# Patient Record
Sex: MALE | Race: ASIAN | NOT HISPANIC OR LATINO | Employment: UNEMPLOYED | URBAN - METROPOLITAN AREA
[De-identification: names, ages, dates, MRNs, and addresses within clinical notes are randomized per-mention and may not be internally consistent; named-entity substitution may affect disease eponyms.]

---
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
## 2021-01-05 ENCOUNTER — DOCUMENTATION ONLY (OUTPATIENT)
Dept: PHARMACY | Facility: CLINIC | Age: 21
End: 2021-01-05

## 2021-01-05 ENCOUNTER — HOME INFUSION (PRE-WILLOW HOME INFUSION) (OUTPATIENT)
Dept: PHARMACY | Facility: CLINIC | Age: 21
End: 2021-01-05

## 2021-01-05 NOTE — PROGRESS NOTES
Skilled Nurse visit in the  patient home to administer Fabrazyme 110mg/500ml ns. No recent elevated temperature, fever, chills, productive cough, coughing for 3 weeks or longer or hemoptysis, abnormal vital signs, night sweats, chest pain. No  decrease in your appetite, unexplained weight loss or fatigue.  No other new onset medical symptoms.  Current weight 236.  PIV placed left hand, 1 attempt.  Pre medicated with [id number] po, solumedrol 80mg iv. Infusion completed without complication or reaction. Pt reports therapy is effective in managing symptoms related to therapy.    Rudolph Lawrence RN   [facility]  [email or web address]  [phone]

## 2021-01-06 ENCOUNTER — HOME INFUSION (PRE-WILLOW HOME INFUSION) (OUTPATIENT)
Dept: PHARMACY | Facility: CLINIC | Age: 21
End: 2021-01-06

## 2021-01-06 NOTE — PROGRESS NOTES
This is a recent snapshot of the patient's [facility] medical record.  For current drug dose and complete information and questions, call [phone] or In Basket pool, fv home infusion ([id number])  CSN Number:  [id number]

## 2021-01-07 NOTE — PROGRESS NOTES
This is a recent snapshot of the patient's [facility] medical record.  For current drug dose and complete information and questions, call [phone] or In Basket pool, fv home infusion ([id number])  CSN Number:  [id number]

## 2021-01-08 NOTE — PROGRESS NOTES
This is a recent snapshot of the patient's [facility] medical record.  For current drug dose and complete information and questions, call [phone] or In Basket pool, fv home infusion ([id number])  CSN Number:  [id number]

## 2021-01-13 ENCOUNTER — HOME INFUSION (PRE-WILLOW HOME INFUSION) (OUTPATIENT)
Dept: PHARMACY | Facility: CLINIC | Age: 21
End: 2021-01-13

## 2021-01-14 ENCOUNTER — HOME INFUSION (PRE-WILLOW HOME INFUSION) (OUTPATIENT)
Dept: PHARMACY | Facility: CLINIC | Age: 21
End: 2021-01-14

## 2021-01-15 ENCOUNTER — HOME INFUSION (PRE-WILLOW HOME INFUSION) (OUTPATIENT)
Dept: PHARMACY | Facility: CLINIC | Age: 21
End: 2021-01-15

## 2021-01-15 NOTE — PROGRESS NOTES
This is a recent snapshot of the patient's [facility] medical record.  For current drug dose and complete information and questions, call [phone] or In Basket pool, fv home infusion ([id number])  CSN Number:  [id number]

## 2021-01-18 NOTE — PROGRESS NOTES
This is a recent snapshot of the patient's [facility] medical record.  For current drug dose and complete information and questions, call [phone] or In Basket pool, fv home infusion ([id number])  CSN Number:  [id number]

## 2021-01-19 ENCOUNTER — DOCUMENTATION ONLY (OUTPATIENT)
Dept: PHARMACY | Facility: CLINIC | Age: 21
End: 2021-01-19

## 2021-01-19 ENCOUNTER — HOME INFUSION (PRE-WILLOW HOME INFUSION) (OUTPATIENT)
Dept: PHARMACY | Facility: CLINIC | Age: 21
End: 2021-01-19

## 2021-01-19 ENCOUNTER — MEDICAL CORRESPONDENCE (OUTPATIENT)
Dept: HEALTH INFORMATION MANAGEMENT | Facility: CLINIC | Age: 21
End: 2021-01-19

## 2021-01-19 ENCOUNTER — TELEPHONE (OUTPATIENT)
Dept: PHARMACY | Facility: CLINIC | Age: 21
End: 2021-01-19

## 2021-01-19 LAB
ALBUMIN SERPL-MCNC: 3.8 G/DL (ref 3.4–5)
ALP SERPL-CCNC: 65 U/L (ref 65–260)
ALT SERPL W P-5'-P-CCNC: 12 U/L (ref 0–50)
ANION GAP SERPL CALCULATED.3IONS-SCNC: 6 MMOL/L (ref 3–14)
AST SERPL W P-5'-P-CCNC: 15 U/L (ref 0–35)
BASOPHILS # BLD AUTO: 0 10E9/L (ref 0–0.2)
BASOPHILS NFR BLD AUTO: 0.5 %
BILIRUB SERPL-MCNC: 0.7 MG/DL (ref 0.2–1.3)
BUN SERPL-MCNC: 9 MG/DL (ref 7–30)
CALCIUM SERPL-MCNC: 8.4 MG/DL (ref 8.5–10.1)
CHLORIDE SERPL-SCNC: 109 MMOL/L (ref 98–110)
CO2 SERPL-SCNC: 26 MMOL/L (ref 20–32)
CREAT SERPL-MCNC: 0.62 MG/DL (ref 0.5–1)
CREAT UR-MCNC: 128 MG/DL
DIFFERENTIAL METHOD BLD: NORMAL
EOSINOPHIL # BLD AUTO: 0.2 10E9/L (ref 0–0.7)
EOSINOPHIL NFR BLD AUTO: 3.6 %
ERYTHROCYTE [DISTWIDTH] IN BLOOD BY AUTOMATED COUNT: 13.5 % (ref 10–15)
GFR SERPL CREATININE-BSD FRML MDRD: >90 ML/MIN/{1.73_M2}
GLUCOSE SERPL-MCNC: 82 MG/DL (ref 70–99)
HCT VFR BLD AUTO: 45.7 % (ref 40–53)
HGB BLD-MCNC: 15 G/DL (ref 13.3–17.7)
IMM GRANULOCYTES # BLD: 0 10E9/L (ref 0–0.4)
IMM GRANULOCYTES NFR BLD: 0.4 %
LYMPHOCYTES # BLD AUTO: 2.3 10E9/L (ref 0.8–5.3)
LYMPHOCYTES NFR BLD AUTO: 40.3 %
MCH RBC QN AUTO: 30.1 PG (ref 26.5–33)
MCHC RBC AUTO-ENTMCNC: 32.8 G/DL (ref 31.5–36.5)
MCV RBC AUTO: 92 FL (ref 78–100)
MICROALBUMIN UR-MCNC: 22 MG/L
MICROALBUMIN/CREAT UR: 17.11 MG/G CR (ref 0–17)
MONOCYTES # BLD AUTO: 0.5 10E9/L (ref 0–1.3)
MONOCYTES NFR BLD AUTO: 9.4 %
NEUTROPHILS # BLD AUTO: 2.6 10E9/L (ref 1.6–8.3)
NEUTROPHILS NFR BLD AUTO: 45.8 %
NRBC # BLD AUTO: 0 10*3/UL
NRBC BLD AUTO-RTO: 0 /100
PLATELET # BLD AUTO: 312 10E9/L (ref 150–450)
POTASSIUM SERPL-SCNC: 3.8 MMOL/L (ref 3.4–5.3)
PROT SERPL-MCNC: 6.6 G/DL (ref 6.8–8.8)
PROT UR-MCNC: 0.21 G/L
PROT/CREAT 24H UR: 0.16 G/G CR (ref 0–0.2)
RBC # BLD AUTO: 4.99 10E12/L (ref 4.4–5.9)
SODIUM SERPL-SCNC: 141 MMOL/L (ref 133–144)
WBC # BLD AUTO: 5.6 10E9/L (ref 4–11)

## 2021-01-19 PROCEDURE — 85025 COMPLETE CBC W/AUTO DIFF WBC: CPT | Performed by: PEDIATRICS

## 2021-01-19 PROCEDURE — 80053 COMPREHEN METABOLIC PANEL: CPT | Performed by: PEDIATRICS

## 2021-01-19 PROCEDURE — 84156 ASSAY OF PROTEIN URINE: CPT | Performed by: PEDIATRICS

## 2021-01-19 PROCEDURE — 82043 UR ALBUMIN QUANTITATIVE: CPT | Performed by: PEDIATRICS

## 2021-01-19 NOTE — TELEPHONE ENCOUNTER
[facility] PRIOR AUTHORIZATION REQUEST     Drug including DOSE: FABRAZYME 110 mg IV Every 2 Weeks  J Code: , , 92638 and 00266  NDC:[address]-17  ICD 10 code: E75.21    Date(s) of Service: 1/19/2021  Insurance Name: [employer]  Insurance ID: [id number]    Provider: FRANK SANTO MD  Provider NPI:[id number]             [facility]  NPI: [id number]

## 2021-01-19 NOTE — TELEPHONE ENCOUNTER
PA Initiation    Medication: FABRAZYME 110 mg IV Every 2 Weeks  Insurance Company: [employer] - Phone [phone] Fax [phone]  Pharmacy Filling the Rx: [facility]  Filling Pharmacy Phone:    Filling Pharmacy Fax:    Start Date: 1/19/2021    Central Prior Authorization Team   Phone: [phone]    Filled out form and faxed it along with chart notes to [facility] fax# [phone]

## 2021-01-19 NOTE — PROGRESS NOTES
Skilled Nurse visit in the  patient home to administer Fabrazyme 110mg/500ml ns.  No recent elevated temperature, fever, chills, productive cough, coughing for 3 weeks or longer or hemoptysis, abnormal vital signs, night sweats, chest pain. No  decrease in your appetite, unexplained weight loss or fatigue.  No other new onset medical symptoms.  Current weight 235.  PIV placed right hand, 1 attempt.  Pre medicated with acetaminophen 975mg po amd solumedrol 80mg iv. Labs drawn CBC d/p, CMP, random urine prootein and albumin quantitative with creat ratio. Infusion completed without complication or reaction. Pt reports therapy is necessary in preventing symptoms related to therapy.    Rudolph Lawrence RN   [facility]  [email or web address]  [phone]

## 2021-01-20 NOTE — PROGRESS NOTES
This is a recent snapshot of the patient's [facility] medical record.  For current drug dose and complete information and questions, call [phone] or In Basket pool, fv home infusion ([id number])  CSN Number:  [id number]

## 2021-01-22 NOTE — PROGRESS NOTES
This is a recent snapshot of the patient's [facility] medical record.  For current drug dose and complete information and questions, call [phone] or In Basket pool, fv home infusion ([id number])  CSN Number:  [id number]

## 2021-01-22 NOTE — TELEPHONE ENCOUNTER
Prior Authorization Approval    Authorization Effective Date: 12/21/2020  Authorization Expiration Date: 1/20/2023  Medication: FABRAZYME 110 mg IV Every 2 Weeks  Approved Dose/Quantity:   Reference #: n/a   Insurance Company: [employer] - Phone [phone] Fax [phone]    Which Pharmacy is filling the prescription (Not needed for infusion/clinic administered): [facility]  Pharmacy Notified: Yes

## 2021-01-29 ENCOUNTER — HOME INFUSION (PRE-WILLOW HOME INFUSION) (OUTPATIENT)
Dept: PHARMACY | Facility: CLINIC | Age: 21
End: 2021-01-29

## 2021-02-01 ENCOUNTER — APPOINTMENT (OUTPATIENT)
Dept: LAB | Facility: CLINIC | Age: 21
End: 2021-02-01
Attending: PEDIATRICS
Payer: COMMERCIAL

## 2021-02-01 NOTE — PROGRESS NOTES
This is a recent snapshot of the patient's [facility] medical record.  For current drug dose and complete information and questions, call [phone] or In Basket pool, fv home infusion ([id number])  CSN Number:  [id number]

## 2021-02-02 ENCOUNTER — DOCUMENTATION ONLY (OUTPATIENT)
Dept: PHARMACY | Facility: CLINIC | Age: 21
End: 2021-02-02

## 2021-02-02 ENCOUNTER — HOME INFUSION (PRE-WILLOW HOME INFUSION) (OUTPATIENT)
Dept: PHARMACY | Facility: CLINIC | Age: 21
End: 2021-02-02

## 2021-02-02 NOTE — PROGRESS NOTES
Skilled Nurse visit in the  patient home to administer Fabrazyme.  No recent elevated temperature, fever, chills, productive cough, coughing for 3 weeks or longer or hemoptysis, abnormal vital signs, night sweats, chest pain. No  decrease in your appetite, unexplained weight loss or fatigue.  No other new onset medical symptoms.  Current weight 235 lb.  PIV placed L hand1, 1 attempt/s.  Pre medicated with Acetaminophen 975 mg by mouth at 0905  Methylprednisolone 80 mg IV push over 15 minutes, given at 0912. Infusion completed without complication or reaction. Pt reports therapy is effective in managing symptoms related to therapy.    Patti Diop RN  [facility]  [email or web address]  [phone]

## 2021-02-03 NOTE — PROGRESS NOTES
This is a recent snapshot of the patient's [facility] medical record.  For current drug dose and complete information and questions, call [phone] or In Basket pool, fv home infusion ([id number])  CSN Number:  [id number]

## 2021-02-12 ENCOUNTER — HOME INFUSION (PRE-WILLOW HOME INFUSION) (OUTPATIENT)
Dept: PHARMACY | Facility: CLINIC | Age: 21
End: 2021-02-12

## 2021-02-15 NOTE — PROGRESS NOTES
This is a recent snapshot of the patient's [facility] medical record.  For current drug dose and complete information and questions, call [phone] or In Basket pool, fv home infusion ([id number])  CSN Number:  [id number]

## 2021-02-16 ENCOUNTER — HOME INFUSION (PRE-WILLOW HOME INFUSION) (OUTPATIENT)
Dept: PHARMACY | Facility: CLINIC | Age: 21
End: 2021-02-16

## 2021-02-16 ENCOUNTER — DOCUMENTATION ONLY (OUTPATIENT)
Dept: PHARMACY | Facility: CLINIC | Age: 21
End: 2021-02-16

## 2021-02-16 NOTE — PROGRESS NOTES
Skilled Nurse visit in the  patient home to administer Fabrazyme 110mg/500ml ns.  No recent elevated temperature, fever, chills, productive cough, coughing for 3 weeks or longer or hemoptysis, abnormal vital signs, night sweats, chest pain. No  decrease in your appetite, unexplained weight loss or fatigue.  No other new onset medical symptoms.  Current weight 225.  PIV placed left hand, 1 attempt.  Pre medicated with acetaminophen 975mg po, solumedrol 80mg iv. Infusion completed without complication or reaction. Pt reports therapy is effective in managing symptoms related to therapy.    Rudolph Lawrence RN   [facility]  [email or web address]  [phone]

## 2021-02-17 ENCOUNTER — HOME INFUSION (PRE-WILLOW HOME INFUSION) (OUTPATIENT)
Dept: PHARMACY | Facility: CLINIC | Age: 21
End: 2021-02-17

## 2021-02-17 NOTE — PROGRESS NOTES
This is a recent snapshot of the patient's [facility] medical record.  For current drug dose and complete information and questions, call [phone] or In Basket pool, fv home infusion ([id number])  CSN Number:  [id number]

## 2021-02-18 NOTE — PROGRESS NOTES
This is a recent snapshot of the patient's [facility] medical record.  For current drug dose and complete information and questions, call [phone] or In Basket pool, fv home infusion ([id number])  CSN Number:  [id number]

## 2021-02-26 ENCOUNTER — HOME INFUSION (PRE-WILLOW HOME INFUSION) (OUTPATIENT)
Dept: PHARMACY | Facility: CLINIC | Age: 21
End: 2021-02-26

## 2021-03-01 ENCOUNTER — APPOINTMENT (OUTPATIENT)
Dept: LAB | Facility: CLINIC | Age: 21
End: 2021-03-01
Attending: PEDIATRICS
Payer: COMMERCIAL

## 2021-03-01 NOTE — PROGRESS NOTES
This is a recent snapshot of the patient's [facility] medical record.  For current drug dose and complete information and questions, call [phone] or In Basket pool, fv home infusion ([id number])  CSN Number:  [id number]

## 2021-03-02 ENCOUNTER — DOCUMENTATION ONLY (OUTPATIENT)
Dept: PHARMACY | Facility: CLINIC | Age: 21
End: 2021-03-02

## 2021-03-02 ENCOUNTER — HOME INFUSION (PRE-WILLOW HOME INFUSION) (OUTPATIENT)
Dept: PHARMACY | Facility: CLINIC | Age: 21
End: 2021-03-02

## 2021-03-02 NOTE — PROGRESS NOTES
Skilled Nurse visit in the  patient home to administer Fabrazyme 110mg/500ml ns.  No recent elevated temperature, fever, chills, productive cough, coughing for 3 weeks or longer or hemoptysis, abnormal vital signs, night sweats, chest pain. No  decrease in your appetite, unexplained weight loss or fatigue.  No other new onset medical symptoms.  Current weight 220.  PIV placed rightAC,1 attempt.  Pre medicated with acetaminophen 975mg po, solumedrol 80mg iv. Infusion completed without complication or reaction. Pt reports therapy is effective in managing symptoms related to therapy.    Rudolph Lawrence RN   [facility]  [email or web address]  [phone]

## 2021-03-03 NOTE — PROGRESS NOTES
This is a recent snapshot of the patient's [facility] medical record.  For current drug dose and complete information and questions, call [phone] or In Basket pool, fv home infusion ([id number])  CSN Number:  [id number]

## 2021-03-12 ENCOUNTER — HOME INFUSION (PRE-WILLOW HOME INFUSION) (OUTPATIENT)
Dept: PHARMACY | Facility: CLINIC | Age: 21
End: 2021-03-12

## 2021-03-15 NOTE — PROGRESS NOTES
This is a recent snapshot of the patient's [facility] medical record.  For current drug dose and complete information and questions, call [phone] or In Basket pool, fv home infusion ([id number])  CSN Number:  [id number]

## 2021-03-16 ENCOUNTER — HOME INFUSION (PRE-WILLOW HOME INFUSION) (OUTPATIENT)
Dept: PHARMACY | Facility: CLINIC | Age: 21
End: 2021-03-16

## 2021-03-16 ENCOUNTER — DOCUMENTATION ONLY (OUTPATIENT)
Dept: PHARMACY | Facility: CLINIC | Age: 21
End: 2021-03-16

## 2021-03-16 NOTE — PROGRESS NOTES
Skilled Nurse visit in the  patient home to administer fabrazyme 110mg/500ml ns  No recent elevated temperature, fever, chills, productive cough, coughing for 3 weeks or longer or hemoptysis, abnormal vital signs, night sweats, chest pain. No  decrease in your appetite, unexplained weight loss or fatigue.  No other new onset medical symptoms.  Current weight 220.  PIV placed right hand, 1 attempt.  Pre medicated with acetaminophen 975mg po, solumedrol 80mg iv. Infusion completed without complication or reaction. Pt reports therapy is effective in managing symptoms related to therapy.    Rudolph Lawrence RN   [facility]  [email or web address]  [phone]

## 2021-03-17 NOTE — PROGRESS NOTES
"Chief Complaint   Patient presents with     Wellness Visit       Initial /75 (BP Location: Right arm, Patient Position: Chair, Cuff Size: Adult Regular)  Pulse 89  Temp 97.5  F (36.4  C) (Tympanic)  Ht 5' 2\" (1.575 m)  Wt 140 lb (63.5 kg)  SpO2 99%  Breastfeeding? No  BMI 25.61 kg/m2 Estimated body mass index is 25.61 kg/(m^2) as calculated from the following:    Height as of this encounter: 5' 2\" (1.575 m).    Weight as of this encounter: 140 lb (63.5 kg).  Medication Reconciliation: complete   Maru Banks CMA       " This is a recent snapshot of the patient's [facility] medical record.  For current drug dose and complete information and questions, call [phone] or In Basket pool, fv home infusion ([id number])  CSN Number:  [id number]

## 2021-03-26 ENCOUNTER — HOME INFUSION (PRE-WILLOW HOME INFUSION) (OUTPATIENT)
Dept: PHARMACY | Facility: CLINIC | Age: 21
End: 2021-03-26

## 2021-03-29 ENCOUNTER — VIRTUAL VISIT (OUTPATIENT)
Dept: CONSULT | Facility: CLINIC | Age: 21
End: 2021-03-29
Attending: PEDIATRICS
Payer: COMMERCIAL

## 2021-03-29 ENCOUNTER — VIRTUAL VISIT (OUTPATIENT)
Dept: CONSULT | Facility: CLINIC | Age: 21
End: 2021-03-29
Attending: GENETIC COUNSELOR, MS
Payer: COMMERCIAL

## 2021-03-29 DIAGNOSIS — E75.21 FABRY DISEASE (H): Primary | ICD-10-CM

## 2021-03-29 PROCEDURE — 999N000069 HC STATISTIC GENETIC COUNSELING, < 16 MIN: Mod: GT | Performed by: GENETIC COUNSELOR, MS

## 2021-03-29 PROCEDURE — 99214 OFFICE O/P EST MOD 30 MIN: CPT | Mod: 95 | Performed by: PEDIATRICS

## 2021-03-29 NOTE — LETTER
3/29/2021      RE: Joel Lundberg  [address] [facility] [address]       Name:  Joel Aviles  :   2000  MRN:   [id number]  Date of service: Mar 29, 2021  Primary Provider: Yariel Carty  Referring Provider: No ref. provider found    PRESENTING INFORMATION   Reason for consultation:  A consultation in the [facility] was requested for Joel, a 20 year old male, for Fabry disease.    Joel was unaccompanied to this visit.    History is obtained from Patient and electronic health record. I met with the family at the request of Dr. Rod to offer genetic counseling regarding his diagnosis of Fabry.    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 The notation of this mutation refers to its location and its effect.  This mutation is reported to not be amenable to the medication Galafold.      Family history is significant for mother and siblings with Fabry disease.    Joel expressed excellent understanding of genetic counseling concepts today.      1. Genetic counseling remains available to Joel if/when he is considering children    2. Contact information was provided should any questions arise in the future.       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 The notation of this mutation refers to its location and its effect.  This mutation is reported to not be amenable to the medication Galafold.        Patient Active Problem List   Diagnosis     Fabry disease (H)     Vasovagal syncope       Past Medical History:  No past medical history on  file.    Past Surgical History:  No past surgical history on file.     FAMILY HISTORY  A three generation pedigree was obtained today and scanned into the EMR. The following information is significant:    Siblings    Full siblings: 2 brothers and 1 sister with Fabry disease.  3 brothers without Fabry disease.  1 brother with Fabry disease also has lymphedema.    Paternal half siblings: None    Maternal half siblings: None    Maternal Family    Mother, OUMAR FIELDS: Fabry disease.  Neuropathy, fatigue, SVT hypertension    Maternal grandfather: Passed from MI at age 50.  Lymphedema and cellulitis. No genetic testing    Maternal grandmother: Type 2 diabetes, neuropathy, glaucoma. CHD. Now 86. No genetic testing    Maternal aunts/uncles: 1 aunt with Fabry disease.  3 reportedly unaffected uncles who have not had genetic testing.    Maternal cousins: 1 female cousin with neuropathy.  Others are well    Maternal ancestry: Pal[place], English, Ivorian    Paternal Family    Father, Tee Lundberg: Well    Paternal grandfather: Passed at age 70 due to TB and heart failure    Paternal grandmother: Well    Paternal aunts/uncles: 1 and 2 passed from asthma attack at 40    Paternal cousins: Well    Paternal ancestry: Indian    The family history is otherwise negative for Fabry disease, neuropathy, kidney issues, fatigue, cardiac concerns, and known genetic disorders. Consanguinity is denied.      DISCUSSION  Genetics  Today we reviewed that our genetic material or DNA is responsible for how our bodies grow and develop. It can be thought of as an instruction manual. This instruction manual is made up of chapters called genes. Our genes are inherited on structures called chromosomes, of which we have 23 pairs for a total of 46. For each chromosome pair, one copy is inherited from the mother and one is inherited from the father. The chromosome pairs are numbered from 1 to 22, and the 23rd pair of chromsomes is called the sex  "chromsomes. These determine if we are a male or female.     Changes in the chromosomes or in the DNA sequence of a gene can cause the signs and symptoms of a genetic condition because the instructions it is providing to the body have been altered. This can be a small spelling error in the gene, a large duplicated piece of information, or a large missing piece of information. We reviewed that Fabry disease is a genetic condition caused by mutations in a gene called GLA which is found on the X-chromosome. This gene codes for a lysosomal protein called alpha-galactosidase A. This enzyme is normally responsible for breaking down a substance called globotriaosylceramide in the lysosomes of cells. Mutations in the GLA gene, therefore, disrupt this degradation process and lead to the accumulation of globotriaosylceramide. This damages cells and can lead to the signs and symptoms of Fabry disease.       X-linked inheritance was discussed. We reviewed how women have two copies of the X-chromosome while men have one copy of the X-chromosome and one copy of the Y-chromosome. The GLA gene is found on the X-chromosome and therefore women have two copies of the GLA gene while men have just one. Because women have this \"back-up\" copy of the GLA gene, a GLA mutation does not typically affect females to the same degree as it does males.  Whether or not a female experiences symptoms of Fabry disease and the severity of symptoms is at least partially determined by differences in X-inactivation.      Because Joel is now 20-years-old we did spend time reviewing the recurrence risk for Fabry disease.  As noted above, Joel has just one copy of the GLA gene on his single copy of the X-chromosome.  For each child he has, there is a 50% chance Joel would pass on his X-chromosome and a 50% chance he would pass on his Y-chromosome.  If he passed on his X-chromosome, that child would be a daughter who is a carrier for Fabry disease and " at risk to have symptoms.  If he passed on his Y-chromosome, that child would be a boy who does not have Fabry disease.  Joel expressed understanding of this.      Reproductive Options  Some individuals chose to have genetic testing performed to see if their child will have a the familial genetic condition. This can be done at different stages during the pregnancy.      The first option is called pre-implantation genetic testing (PGT), which was previously called PGD. Eggs and sperm are taken from parents and fertilized outside the body. This testing is done on the fertilized embryo. The embryos without the genetic change are implanted into the mother with in-vitro fertilization (IVF). Each individual case needs to be approved by the PGT lab. Centers that perform PGT include:     The second option is called prenatal genetic testing. This can be during pregnancy. This test is done on placental tissue through a procedure called chorionic villus sampling (CVS) or on amniotic fluid through a procedure called amniocentesis. These procedures are accompanied by a small risk of miscarriage, which varies by institution.    Post-sandra genetic testing can be done after a child is born. We discussed that I can coordinate this testing if she is interested in this option.    Rather than use genetic testing, some couples choose to have gametes (i.e. sperm, egg, or embryo) donated or choose to adopt a child.    Lastly, some couples choose to have unassisted pregnancies without the use of genetic testing.    There are benefits and limitations of each of these options including timing, cost, accuracy of the genetic testing, how the information would be used by the couple (e.g. for prevention, termination, or preparation), risk to the pregnancy, and personal beliefs. How the couple manages anxiety in the context of waiting for genetic test results should also be considered.       We also briefly reviewed that carrier screening is  available for other genetic conditions, which can be discussed with a prenatal genetic counselor      Yulisa Ashley [facility]  Genetic Counselor   [facility]   Phone: [phone]  Pager: [phone]  Email: [email or web address]          Approximate Time Spent in Consultation: 10 min     CC: patient    Joel Oliveira Tamika  [address] [facility] [address]      This note was written with the assistance of voice recognition software and may contain occasional typographic errors. Please contact our office if you identify errors requiring correction.    Joel is a 20 year old who is being evaluated via a billable video visit.      How would you like to obtain your AVS? Mail a copy  If the video visit is dropped, the invitation should be resent by: Send to e-mail at: [email or web address]  Will anyone else be joining your video visit? No      Yulisa Ashley GC

## 2021-03-29 NOTE — PROGRESS NOTES
Name:  Joel Aviles  :   2000  MRN:   [id number]  Date of service: Mar 29, 2021  Primary Provider: Yariel Carty  Referring Provider: No ref. provider found    PRESENTING INFORMATION   Reason for consultation:  A consultation in the [facility] was requested for Joel, a 20 year old male, for Fabry disease.    Joel was unaccompanied to this visit.    History is obtained from Patient and electronic health record. I met with the family at the request of Dr. Rod to offer genetic counseling regarding his diagnosis of Fabry.    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 Joel's diagnosis of Fabry disease was made in  when he was 10 years old at the [facility].  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 The notation of this mutation refers to its location and its effect.  This mutation is reported to not be amenable to the medication Galafold.      Family history is significant for mother and siblings with Fabry disease.    Joel expressed excellent understanding of genetic counseling concepts today.      1. Genetic counseling remains available to Joel if/when he is considering children    2. Contact information was provided should any questions arise in the future.       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 The notation of this mutation refers to its location and its effect.  This mutation is reported to not be amenable to the medication Galafold.        Patient Active Problem List   Diagnosis     Fabry disease (H)     Vasovagal syncope       Past Medical History:  No past medical history on file.    Past Surgical History:  No past surgical history on file.     FAMILY HISTORY  A three  generation pedigree was obtained today and scanned into the EMR. The following information is significant:    Siblings    Full siblings: 2 brothers and 1 sister with Fabry disease.  3 brothers without Fabry disease.  1 brother with Fabry disease also has lymphedema.    Paternal half siblings: None    Maternal half siblings: None    Maternal Family    Mother, OUMAR FIELDS: Fabry disease.  Neuropathy, fatigue, SVT hypertension    Maternal grandfather: Passed from MI at age 50.  Lymphedema and cellulitis. No genetic testing    Maternal grandmother: Type 2 diabetes, neuropathy, glaucoma. CHD. Now 86. No genetic testing    Maternal aunts/uncles: 1 aunt with Fabry disease.  3 reportedly unaffected uncles who have not had genetic testing.    Maternal cousins: 1 female cousin with neuropathy.  Others are well    Maternal ancestry: Palestinian, English, Japanese    Paternal Family    Father, Tee Lundberg: Well    Paternal grandfather: Passed at age 70 due to TB and heart failure    Paternal grandmother: Well    Paternal aunts/uncles: 1 and 2 passed from asthma attack at 40    Paternal cousins: Well    Paternal ancestry: Cymraes    The family history is otherwise negative for Fabry disease, neuropathy, kidney issues, fatigue, cardiac concerns, and known genetic disorders. Consanguinity is denied.      DISCUSSION  Genetics  Today we reviewed that our genetic material or DNA is responsible for how our bodies grow and develop. It can be thought of as an instruction manual. This instruction manual is made up of chapters called genes. Our genes are inherited on structures called chromosomes, of which we have 23 pairs for a total of 46. For each chromosome pair, one copy is inherited from the mother and one is inherited from the father. The chromosome pairs are numbered from 1 to 22, and the 23rd pair of chromsomes is called the sex chromsomes. These determine if we are a male or female.     Changes in the chromosomes or in the  "DNA sequence of a gene can cause the signs and symptoms of a genetic condition because the instructions it is providing to the body have been altered. This can be a small spelling error in the gene, a large duplicated piece of information, or a large missing piece of information. We reviewed that Fabry disease is a genetic condition caused by mutations in a gene called GLA which is found on the X-chromosome. This gene codes for a lysosomal protein called alpha-galactosidase A. This enzyme is normally responsible for breaking down a substance called globotriaosylceramide in the lysosomes of cells. Mutations in the GLA gene, therefore, disrupt this degradation process and lead to the accumulation of globotriaosylceramide. This damages cells and can lead to the signs and symptoms of Fabry disease.       X-linked inheritance was discussed. We reviewed how women have two copies of the X-chromosome while men have one copy of the X-chromosome and one copy of the Y-chromosome. The GLA gene is found on the X-chromosome and therefore women have two copies of the GLA gene while men have just one. Because women have this \"back-up\" copy of the GLA gene, a GLA mutation does not typically affect females to the same degree as it does males.  Whether or not a female experiences symptoms of Fabry disease and the severity of symptoms is at least partially determined by differences in X-inactivation.      Because Joel is now 20-years-old we did spend time reviewing the recurrence risk for Fabry disease.  As noted above, Joel has just one copy of the GLA gene on his single copy of the X-chromosome.  For each child he has, there is a 50% chance Joel would pass on his X-chromosome and a 50% chance he would pass on his Y-chromosome.  If he passed on his X-chromosome, that child would be a daughter who is a carrier for Fabry disease and at risk to have symptoms.  If he passed on his Y-chromosome, that child would be a boy who does " not have Fabry disease.  Joel expressed understanding of this.      Reproductive Options  Some individuals chose to have genetic testing performed to see if their child will have a the familial genetic condition. This can be done at different stages during the pregnancy.      The first option is called pre-implantation genetic testing (PGT), which was previously called PGD. Eggs and sperm are taken from parents and fertilized outside the body. This testing is done on the fertilized embryo. The embryos without the genetic change are implanted into the mother with in-vitro fertilization (IVF). Each individual case needs to be approved by the PGT lab. Centers that perform PGT include:     The second option is called prenatal genetic testing. This can be during pregnancy. This test is done on placental tissue through a procedure called chorionic villus sampling (CVS) or on amniotic fluid through a procedure called amniocentesis. These procedures are accompanied by a small risk of miscarriage, which varies by institution.    Post-sandra genetic testing can be done after a child is born. We discussed that I can coordinate this testing if she is interested in this option.    Rather than use genetic testing, some couples choose to have gametes (i.e. sperm, egg, or embryo) donated or choose to adopt a child.    Lastly, some couples choose to have unassisted pregnancies without the use of genetic testing.    There are benefits and limitations of each of these options including timing, cost, accuracy of the genetic testing, how the information would be used by the couple (e.g. for prevention, termination, or preparation), risk to the pregnancy, and personal beliefs. How the couple manages anxiety in the context of waiting for genetic test results should also be considered.       We also briefly reviewed that carrier screening is available for other genetic conditions, which can be discussed with a prenatal genetic  counselor      Yulisa Ashley [facility]  Genetic Counselor   [facility]   Phone: [phone]  Pager: [phone]  Email: [email or web address]          Approximate Time Spent in Consultation: 10 min     CC: patient      This note was written with the assistance of voice recognition software and may contain occasional typographic errors. Please contact our office if you identify errors requiring correction.

## 2021-03-29 NOTE — LETTER
"  3/29/2021      RE: Joel Patiñoih  [address] [facility] [address]       Joel is a 20 year old who is being evaluated via a billable video visit.      How would you like to obtain your AVS? Mail a copy  If the video visit is dropped, the invitation should be resent by: Send to e-mail at: [email or web address]  Will anyone else be joining your video visit? No                    Advanced [facility]ies  [facility] [address]  [address] [place] [address]   Phone: [phone]  Fax: [phone]  Date: 2021      Patient:  Joel Lundberg   :   2000   MRN:     [id number]      Joel Patiñoih  [address] [facility] [address]    Dear Dr. Yariel Carty and Joel Lundberg,    Thank you for sending Joel Lundberg to the [facility] Monday \"Advanced Therapies Clinic\" for consultation and treatment of:    No diagnosis found.    PAST MEDICAL HISTORY:    From the oral history, and medical records that are available, these items are noted:    Patient Active Problem List   Diagnosis     Fabry disease (H)     Vasovagal syncope       Medications:  Current Outpatient Medications   Medication Sig     agalsidase beta (FABRAZYME) 5 MG Inject 90 mg into the vein every 14 days Please see Epic Therapy Plan for infusion order details.     Cholecalciferol (VITAMIN D-3 PO) Take 25,000 Int'l Units by mouth once a week     Montelukast Sodium (SINGULAIR PO) Take 5 mg by mouth as needed      OMEPRAZOLE PO Take 40 mg by mouth as needed      Pseudoeph-Doxylamine-DM-APAP (NYQUIL PO) Take by mouth At Bedtime     No current facility-administered medications for this visit.        Allergies:  Allergies   Allergen Reactions     Sulfa Drugs        Physical Examination:  There were no vitals taken for this visit.  Weight %tile:No weight on file for this encounter.  Height %tile: No height on file for this encounter.  Head Circumference %tile: No head circumference on file " for this encounter.  BMI %tile: No height and weight on file for this encounter.    FAMILY HISTORY: A brief family medical history was reviewed.  REVIEW OF SYSTEMS: The review of systems negative for new eye, ear, heart, lung, liver, spleen, gastrointestinal, bone, muscle, integumentary, endocrinologic, brain or psychiatric issues except as noted above.  PHYSICAL EXAMINATION:   General: The patient is oriented to person, place and time at an age-appropriate manner.   HEENT: The facial features are normal and symmetric. The ears are of normal position and configuration and hearing is grossly normal.  The tongue protrudes normally without fasciculations.  Neck: The neck  appears to be supple with full range of motion  Chest: The chest is of normal configuration. There is no tachypnea or other visible abnormalies.  Heart: The patient appears to be well perfused, and in no apparent distress.  Abdomen: Not examined.  Extremities: The extremities are of normal configuration.  Back: Not examined,  Integument: The  visible portion of the integument is  of normal appearance without significant changes in pigmentation, birthmarks, or lesions.  Neuromuscular:  Mental Status Exam: Alert, awake. Fully oriented. No dysarthria; speech of normal fluency.  Cranial Nerves: EOMs intact, no nystagmus, facial movements symmetric.   Motor: There appears to be normal movement in all four extremities, no atrophy or fasciculations. No tremors.  Gait: By visual examination, there appears to be normal gait; normal arm swing and stance.    LABORATORY RESULTS: Laboratory studies from the past year were reviewed.    ASSESSMENT:  1. Fabry disease  2. Good response to enzyme replacement therapy    PLAN/RECOMMENDATIONS:  1. Continue Fabrazyme enzyme replacement therapy  2. Comprehensive metabolic panel  3. Recheck lyso-GL3 biomarker levels every 3 months  4. Recheck anti-Fabrazyme antibody levels every 3 months.  5. Complete blood  "count  6. Pharmacotherapy Consultation for Rare Metabolic Diseases, Dr. Ambrosio Almonte  7. Return to [facility] Clinic in 12 months.  There will be an informational scientific meeting with international experts who are knowledgeable about your condition --- the 17th annual WORLDSymposium (February 7-11, 2022).     FOLLOW-UP INSTRUCTIONS FOR THE PATIENT:  If you are returning to clinic to review specific laboratory tests, please call the Genetic Counselor (see phone numbers below)  to confirm that we have received all of the results from reference laboratories prior to your appointment. If we have not received all of the test results, please discuss re-scheduling your appointment.    With warmest regards,      Sarbjit Rod Ph.D., M.D.  Professor of Pediatrics  Medical Director, Advanced Therapies Program  Medical Director, PKU and Maternal PKU Clinic    Appointments: [phone]      Monday mornings: [facility] for Lysosomal Diseases Clinic   Monday afternoons: PKU Clinic, Metabolism Clinic, and Genetics Clinic    Nurse Coordinator, Metabolism and Genetics:  Claudia Gil RN, [phone]    Pharmacotherapy Consultant:  Ambrosio Almonte, PharmD, Pharmacotherapy for Metabolic Disorders (PIMD): [phone]    Genetic Counselor:  Michelle Real MS, [facility] (Genetic test Results): [phone]    Metabolic Dietician:  Heather Reis, Registered Dietician: [phone]    [facility] Clinic Scheduler:  Alise Majano, [phone]    Copies to:     Dr. Yariel Carty  [facility] [address] [facility] [address]  [facility] [address]    Joel Patiñoih  [address] [facility] [address]          Joel Lundberg is a 19 year old male who is being evaluated via a billable video visit.      The patient has been notified of following:     \"This video visit will be conducted via a call between you and your physician/provider. We have found that certain health care needs can be provided " "without the need for an in-person physical exam.  This service lets us provide the care you need with a video conversation.  If a prescription is necessary we can send it directly to your pharmacy.  If lab work is needed we can place an order for that and you can then stop by our lab to have the test done at a later time.    Video visits are billed at different rates depending on your insurance coverage.  Please reach out to your insurance provider with any questions.    If during the course of the call the physician/provider feels a video visit is not appropriate, you will not be charged for this service.\"    Patient has given verbal consent for Video visit? Yes  How would you like to obtain your AVS? Mail a copy  Patient would like the video invitation sent by: Send to e-mail at: [email or web address]  Will anyone else be joining your video visit? Ambrosio Almonte, PorterD and Hortencia ZUNIGA          Video-Visit Details    Type of service:  Video Visit    Video Start Time: 12:30 PM  Video End Time: 1:00 PM    Originating Location (pt. Location): Home    Distant Location (provider location):  [employer] [employer]     Platform used for Video Visit: Jamie Rod MD                    Advanced Therapies  [facility] [address]  [address] [place] [address]   Phone: [phone]  Fax: [phone]  Date: 2021      Patient:  Joel Lundberg   :   2000   MRN:     [id number]      Joel Patiñoih  [address] [facility] [address]    Dear Dr. Yariel Carty and Joel JENNIFER Oliveira Tamika,    Thank you for sending Joel Lundberg to the [facility] Monday \"Advanced Therapies Clinic\" for consultation and treatment of:    Fabry disease    PAST MEDICAL HISTORY:    From the oral history, and medical records that are available, these items are noted:    Patient Active Problem List   Diagnosis     Fabry disease (H)     Vasovagal syncope       Today we have a virtual visit " with Joel, an 19 year old male with primary diagnosis of Fabry disease.  Joel reports that he has had no major medical issues or hospitalizations since our last visit.  He states that his infusions are going well and has no ill effects from these and that he is taking methylprednisone as a premed.  When asked about his ability to sweat, Joel says that he only sweats around his face and armpits.  He had COVID last year and had a high fever, weakness, loss of smell and loss of taste for about four days.  His last echo and EKG were in September 2020 and were reported to be normal.  He is currently on Fabrazyme 110 mg every two weeks and tolerates this well.  He has no baseline numbness, pain or GI issues.  He no longer takes omeprazole or Singulair.     Medications:  Current Outpatient Medications   Medication Sig     agalsidase beta (FABRAZYME) 5 MG Inject 90 mg into the vein every 14 days Please see Epic Therapy Plan for infusion order details.     Cholecalciferol (VITAMIN D-3 PO) Take 25,000 Int'l Units by mouth once a week     Montelukast Sodium (SINGULAIR PO) Take 5 mg by mouth as needed      OMEPRAZOLE PO Take 40 mg by mouth as needed      Pseudoeph-Doxylamine-DM-APAP (NYQUIL PO) Take by mouth At Bedtime     No current facility-administered medications for this visit.        Allergies:  Allergies   Allergen Reactions     Sulfa Drugs        Physical Examination:  There were no vitals taken for this visit.  Weight %tile:No weight on file for this encounter.  Height %tile: No height on file for this encounter.  Head Circumference %tile: No head circumference on file for this encounter.  BMI %tile: No height and weight on file for this encounter.    FAMILY HISTORY: A brief family medical history was reviewed.  REVIEW OF SYSTEMS: The review of systems negative for new eye, ear, heart, lung, liver, spleen, gastrointestinal, bone, muscle, integumentary, endocrinologic, brain or psychiatric issues except as  noted above.  PHYSICAL EXAMINATION:   General: The patient is oriented to person, place and time at an age-appropriate manner.   HEENT: The facial features are normal and symmetric. The ears are of normal position and configuration and hearing is grossly normal.  The oropharynx is benign and the tongue protrudes normally without fasciculations.  Neck: The neck is supple with full range of motion  Chest: The chest is of normal configuration.  Heart: Not examined.  Abdomen: Not examined.  Extremities: The extremities are of normal configuration without contractures nor hyperlaxities.  Back: The back was straight without scoliosis.   Integument: The integument is  of normal appearance without significant changes in pigmentation, birthmarks, or lesions.  Neuromuscular:  Mental Status Exam: Alert, awake. Fully oriented. No dysarthria, no dysphasia. Speech of normal fluency.    LABORATORY RESULTS: Laboratory studies from the past year were reviewed.  3. Fabry disease  4. On ERT once every two weeks  5. Stable biomarkers  6. At risk for cardiomyopathy/ myocardial infarction  7. At risk for stroke     PLAN/RECOMMENDATIONS:  8. Continue ERT  9. Obtain monitoring labs with this visit  10. Recommend obtaining biomarkers plasma GL3 and LysoGL3 every 4 months and Antibody levels every 6 month.  11. Cut down the hydrocortisone premedication from 80mg to 40 mg now x 2 infusions. If no acute events, will cut it down to 20 mg x 2 infusions and discontinue hydrocortisone  12. Recommend evaluation by pediatric endocrinology (Dr. Fuad Ashley) for evaluation of the need for ACTH stim test before weaning down hydrocortisone. Endocrinology referral provided.  13. Individuals with Fabry disease are at risk for stroke and MI due to their underlying disease. It is highly important to reduce the additional risk related to hypercholesterolemia. Recommend PCP to evaluate and treat Omi Lundberg hypercholestrolemia with dietary,  lifestyle interventions and medications as needed.  14. Regular cardiology evaluation by Steve Mckeon with echocardioram and ekg every 1-2 years  15. Recommend annual evaluation with Dr. Anthony Johnson (nephrology) for evaluation of renal function  16. Pharmacotherapy Consultation for Rare Metabolic Diseases, Dr. Ambrosio Almonte in 1 year  Return to clinic 1 year.    FOLLOW-UP INSTRUCTIONS FOR THE PATIENT:  If you are returning to clinic to review specific laboratory tests, please call the Genetic Counselor (see phone numbers below)  to confirm that we have received all of the results from reference laboratories prior to your appointment. If we have not received all of the test results, please discuss re-scheduling your appointment.      With warmest regards,      Sarbjit Rod Ph.D., M.D.  Professor of Pediatrics  Medical Director, Advanced Therapies Program  Medical Director, PKU and Maternal PKU Clinic    Appointments: [phone]      Monday mornings: [facility]ies for Lysosomal Diseases Clinic   Monday afternoons: PKU Clinic, Metabolism Clinic, and Genetics Clinic    Nurse Coordinator, Metabolism and Genetics:  Quiana Louise RN, [phone]    Pharmacotherapy Consultant:    Ambrosio Almonte, PharmD, Pharmacotherapy for Metabolic Disorders (PIMD): [phone]    Genetic Counselor:  Ronda Villegas MS, [facility] (Genetic test Results): [phone]    Metabolic Dietician:  Heather Reis, Registered Dietician: [phone]    [facility] Clinic Scheduler:  Alise Majano, [phone]    Copies to:     Dr. Yariel Carty  [facility] [address] [facility] [address]  [facility] [address]    Joel Oliveira [facility]  [address] [facility] [address]

## 2021-03-29 NOTE — PROGRESS NOTES
This is a recent snapshot of the patient's [facility] medical record.  For current drug dose and complete information and questions, call [phone] or In Basket pool, [facility]v home infusion ([id number])  CSN Number:  [id number]

## 2021-03-29 NOTE — PROGRESS NOTES
Joel is a 20 year old who is being evaluated via a billable video visit.      How would you like to obtain your AVS? Mail a copy  If the video visit is dropped, the invitation should be resent by: Send to e-mail at: [email or web address]  Will anyone else be joining your video visit? No

## 2021-03-29 NOTE — PROGRESS NOTES
"Joel is a 20 year old who is being evaluated via a billable video visit.      How would you like to obtain your AVS? Mail a copy  If the video visit is dropped, the invitation should be resent by: Send to e-mail at: [email or web address]  Will anyone else be joining your video visit? No                    Advanced Therapies  [facility] [address] [place] [address]   Phone: [phone]  Fax: [phone]  Date: 2021      Patient:  Joel Lundberg   :   2000   MRN:     [id number]      Joel Patiñoih  [address] [facility] [address]    Dear Dr. Yariel Carty and Joel Lundberg,    Thank you for sending Joel Lundberg to the [facility] Monday \"Advanced Therapies Clinic\" for consultation and treatment of:    No diagnosis found.    PAST MEDICAL HISTORY:    From the oral history, and medical records that are available, these items are noted:    Patient Active Problem List   Diagnosis     Fabry disease (H)     Vasovagal syncope       Medications:  Current Outpatient Medications   Medication Sig     agalsidase beta (FABRAZYME) 5 MG Inject 90 mg into the vein every 14 days Please see Epic Therapy Plan for infusion order details.     Cholecalciferol (VITAMIN D-3 PO) Take 25,000 Int'l Units by mouth once a week     Montelukast Sodium (SINGULAIR PO) Take 5 mg by mouth as needed      OMEPRAZOLE PO Take 40 mg by mouth as needed      Pseudoeph-Doxylamine-DM-APAP (NYQUIL PO) Take by mouth At Bedtime     No current facility-administered medications for this visit.        Allergies:  Allergies   Allergen Reactions     Sulfa Drugs        Physical Examination:  There were no vitals taken for this visit.  Weight %tile:No weight on file for this encounter.  Height %tile: No height on file for this encounter.  Head Circumference %tile: No head circumference on file for this encounter.  BMI %tile: No height and weight on file for this encounter.    FAMILY " HISTORY: A brief family medical history was reviewed.  REVIEW OF SYSTEMS: The review of systems negative for new eye, ear, heart, lung, liver, spleen, gastrointestinal, bone, muscle, integumentary, endocrinologic, brain or psychiatric issues except as noted above.  PHYSICAL EXAMINATION:   General: The patient is oriented to person, place and time at an age-appropriate manner.   HEENT: The facial features are normal and symmetric. The ears are of normal position and configuration and hearing is grossly normal.  The tongue protrudes normally without fasciculations.  Neck: The neck  appears to be supple with full range of motion  Chest: The chest is of normal configuration. There is no tachypnea or other visible abnormalies.  Heart: The patient appears to be well perfused, and in no apparent distress.  Abdomen: Not examined.  Extremities: The extremities are of normal configuration.  Back: Not examined,  Integument: The  visible portion of the integument is  of normal appearance without significant changes in pigmentation, birthmarks, or lesions.  Neuromuscular:  Mental Status Exam: Alert, awake. Fully oriented. No dysarthria; speech of normal fluency.  Cranial Nerves: EOMs intact, no nystagmus, facial movements symmetric.   Motor: There appears to be normal movement in all four extremities, no atrophy or fasciculations. No tremors.  Gait: By visual examination, there appears to be normal gait; normal arm swing and stance.    LABORATORY RESULTS: Laboratory studies from the past year were reviewed.    ASSESSMENT:  1. Fabry disease  2. Good response to enzyme replacement therapy    PLAN/RECOMMENDATIONS:  1. Continue Fabrazyme enzyme replacement therapy  2. Comprehensive metabolic panel  3. Recheck lyso-GL3 biomarker levels every 3 months  4. Recheck anti-Fabrazyme antibody levels every 3 months.  5. Complete blood count  6. Pharmacotherapy Consultation for Rare Metabolic Diseases, Dr. Ambrosio Almonte  7. Return to Advanced  Therapies Clinic in 12 months.  There will be an informational scientific meeting with international experts who are knowledgeable about your condition --- the 17th annual WORLDSymposium (February 7-11, 2022).     FOLLOW-UP INSTRUCTIONS FOR THE PATIENT:  If you are returning to clinic to review specific laboratory tests, please call the Genetic Counselor (see phone numbers below)  to confirm that we have received all of the results from reference laboratories prior to your appointment. If we have not received all of the test results, please discuss re-scheduling your appointment.    With warmest regards,      Sarbjit Rod Ph.D., M.D.  Professor of Pediatrics  Medical Director, Advanced Therapies Program  Medical Director, PKU and Maternal PKU Clinic    Appointments: [phone]      Monday mornings: Advanced Therapies for Lysosomal Diseases Clinic   Monday afternoons: PKU Clinic, Metabolism Clinic, and Genetics Clinic    Nurse Coordinator, Metabolism and Genetics:  Claudia Gil RN, [phone]    Pharmacotherapy Consultant:  Ambrosio Almonte, PharmD, Pharmacotherapy for Metabolic Disorders (PIMD): [phone]    Genetic Counselor:  Michelle Real MS, [facility] (Genetic test Results): [phone]    Metabolic Dietician:  Heather Reis, Registered Dietician: [phone]    Advanced Therapies Clinic Scheduler:  Alise Majano, [phone]    Copies to:     Dr. Yariel Carty  [facility] [address] [facility] [address]  [facility] [address]    Joel Oliveira [facility]  [address] [facility] [address]

## 2021-03-30 ENCOUNTER — HOME INFUSION (PRE-WILLOW HOME INFUSION) (OUTPATIENT)
Dept: PHARMACY | Facility: CLINIC | Age: 21
End: 2021-03-30

## 2021-03-30 ENCOUNTER — DOCUMENTATION ONLY (OUTPATIENT)
Dept: PHARMACY | Facility: CLINIC | Age: 21
End: 2021-03-30

## 2021-03-30 RX ORDER — METHYLPREDNISOLONE SODIUM SUCCINATE 40 MG/ML
40 INJECTION, POWDER, LYOPHILIZED, FOR SOLUTION INTRAMUSCULAR; INTRAVENOUS ONCE
Status: CANCELLED
Start: 2021-03-30 | End: 2021-03-30

## 2021-03-30 NOTE — PROGRESS NOTES
Skilled Nurse visit in the  patient home to administer Fabrazyme 110mg/500ml ns.  No recent elevated temperature, fever, chills, productive cough, coughing for 3 weeks or longer or hemoptysis, abnormal vital signs, night sweats, chest pain. No  decrease in your appetite, unexplained weight loss or fatigue.  No other new onset medical symptoms.  Current weight 220.  PIV placed right median basilic, 1 attempt.  Pre medicated with acetaminophen 975mg po, solumedrol 80mg iv. Infusion completed without complication or reaction. Pt reports therapy is effective in managing symptoms related to therapy.    Rudolph Lawrence RN   [facility]  [email or web address]  [phone]

## 2021-03-30 NOTE — PROGRESS NOTES
"    Joel Lundberg is a 19 year old male who is being evaluated via a billable video visit.      The patient has been notified of following:     \"This video visit will be conducted via a call between you and your physician/provider. We have found that certain health care needs can be provided without the need for an in-person physical exam.  This service lets us provide the care you need with a video conversation.  If a prescription is necessary we can send it directly to your pharmacy.  If lab work is needed we can place an order for that and you can then stop by our lab to have the test done at a later time.    Video visits are billed at different rates depending on your insurance coverage.  Please reach out to your insurance provider with any questions.    If during the course of the call the physician/provider feels a video visit is not appropriate, you will not be charged for this service.\"    Patient has given verbal consent for Video visit? Yes  How would you like to obtain your AVS? Mail a copy  Patient would like the video invitation sent by: Send to e-mail at: [email or web address]  Will anyone else be joining your video visit? Ambrosio Almonte, PharmD and Hortencia ZUNIGA          Video-Visit Details    Type of service:  Video Visit    Video Start Time: 12:30 PM  Video End Time: 1:00 PM    Originating Location (pt. Location): Home    Distant Location (provider location):  [facility]     Platform used for Video Visit: Jamie Rod MD                    Advanced Therapies  [facility] [address] [place] [address]   Phone: [phone]  Fax: [phone]  Date: 2021      Patient:  Joel Lundberg   :   2000   MRN:     [id number]      Joel Lundberg  [address] [facility] [address]    Dear Dr. Yariel Carty and Joel Lundberg,    Thank you for sending Joel Lundberg to the [facility] Monday \"Advanced " "Therapies Clinic\" for consultation and treatment of:    Fabry disease    PAST MEDICAL HISTORY:    From the oral history, and medical records that are available, these items are noted:    Patient Active Problem List   Diagnosis     Fabry disease (H)     Vasovagal syncope       Today we have a virtual visit with Joel, an 19 year old male with primary diagnosis of Fabry disease.  Joel reports that he has had no major medical issues or hospitalizations since our last visit.  He states that his infusions are going well and has no ill effects from these and that he is taking methylprednisone as a premed.  When asked about his ability to sweat, Joel says that he only sweats around his face and armpits.  He had COVID last year and had a high fever, weakness, loss of smell and loss of taste for about four days.  His last echo and EKG were in September 2020 and were reported to be normal.  He is currently on Fabrazyme 110 mg every two weeks and tolerates this well.  He has no baseline numbness, pain or GI issues.  He no longer takes omeprazole or Singulair.     Medications:  Current Outpatient Medications   Medication Sig     agalsidase beta (FABRAZYME) 5 MG Inject 90 mg into the vein every 14 days Please see Epic Therapy Plan for infusion order details.     Cholecalciferol (VITAMIN D-3 PO) Take 25,000 Int'l Units by mouth once a week     Montelukast Sodium (SINGULAIR PO) Take 5 mg by mouth as needed      OMEPRAZOLE PO Take 40 mg by mouth as needed      Pseudoeph-Doxylamine-DM-APAP (NYQUIL PO) Take by mouth At Bedtime     No current facility-administered medications for this visit.        Allergies:  Allergies   Allergen Reactions     Sulfa Drugs        Physical Examination:  There were no vitals taken for this visit.  Weight %tile:No weight on file for this encounter.  Height %tile: No height on file for this encounter.  Head Circumference %tile: No head circumference on file for this encounter.  BMI %tile: No " height and weight on file for this encounter.    FAMILY HISTORY: A brief family medical history was reviewed.  REVIEW OF SYSTEMS: The review of systems negative for new eye, ear, heart, lung, liver, spleen, gastrointestinal, bone, muscle, integumentary, endocrinologic, brain or psychiatric issues except as noted above.  PHYSICAL EXAMINATION:   General: The patient is oriented to person, place and time at an age-appropriate manner.   HEENT: The facial features are normal and symmetric. The ears are of normal position and configuration and hearing is grossly normal.  The oropharynx is benign and the tongue protrudes normally without fasciculations.  Neck: The neck is supple with full range of motion  Chest: The chest is of normal configuration.  Heart: Not examined.  Abdomen: Not examined.  Extremities: The extremities are of normal configuration without contractures nor hyperlaxities.  Back: The back was straight without scoliosis.   Integument: The integument is  of normal appearance without significant changes in pigmentation, birthmarks, or lesions.  Neuromuscular:  Mental Status Exam: Alert, awake. Fully oriented. No dysarthria, no dysphasia. Speech of normal fluency.    LABORATORY RESULTS: Laboratory studies from the past year were reviewed.  1. Fabry disease  2. On ERT once every two weeks  3. Stable biomarkers  4. At risk for cardiomyopathy/ myocardial infarction  5. At risk for stroke     PLAN/RECOMMENDATIONS:  1. Continue ERT  2. Obtain monitoring labs with this visit  3. Recommend obtaining biomarkers plasma GL3 and LysoGL3 every 4 months and Antibody levels every 6 month.  4. Cut down the hydrocortisone premedication from 80mg to 40 mg now x 2 infusions. If no acute events, will cut it down to 20 mg x 2 infusions and discontinue hydrocortisone  5. Recommend evaluation by pediatric endocrinology (Dr. Fuad Ashley) for evaluation of the need for ACTH stim test before weaning down hydrocortisone.  Endocrinology referral provided.  6. Individuals with Fabry disease are at risk for stroke and MI due to their underlying disease. It is highly important to reduce the additional risk related to hypercholesterolemia. Recommend PCP to evaluate and treat Omi Lundberg hypercholestrolemia with dietary, lifestyle interventions and medications as needed.  7. Regular cardiology evaluation by Steve Mckeon with echocardioram and ekg every 1-2 years  8. Recommend annual evaluation with Dr. Anthony Johnson (nephrology) for evaluation of renal function  9. Pharmacotherapy Consultation for Rare Metabolic Diseases, Dr. Ambrosio Alomnte in 1 year  Return to clinic 1 year.    FOLLOW-UP INSTRUCTIONS FOR THE PATIENT:  If you are returning to clinic to review specific laboratory tests, please call the Genetic Counselor (see phone numbers below)  to confirm that we have received all of the results from reference laboratories prior to your appointment. If we have not received all of the test results, please discuss re-scheduling your appointment.      With warmest regards,      Sarbjit Rod Ph.D., M.D.  Professor of Pediatrics  Medical Director, Advanced Therapies Program  Medical Director, PKU and Maternal PKU Clinic    Appointments: [phone]      Monday mornings: Advanced Therapies for Lysosomal Diseases Clinic   Monday afternoons: PKU Clinic, Metabolism Clinic, and Genetics Clinic    Nurse Coordinator, Metabolism and Genetics:  Quiana Louise RN, [phone]    Pharmacotherapy Consultant:    Ambrosio Almonte, PharmD, Pharmacotherapy for Metabolic Disorders (PIMD): [phone]    Genetic Counselor:  Ronda Villegas MS, [facility] (Genetic test Results): [phone]    Metabolic Dietician:  Heather Reis, Registered Dietician: [phone]    Advanced Therapies Clinic Scheduler:  Alise Majano, [phone]    Copies to:     Dr. Yariel Carty  [facility] [address] [facility] [address]  [facility] [address]    Joel Oliveira  [facility]  [address] [facility] [address]

## 2021-03-31 NOTE — PROGRESS NOTES
This is a recent snapshot of the patient's [facility] medical record.  For current drug dose and complete information and questions, call [phone] or In Basket pool, fv home infusion ([id number])  CSN Number:  [id number]

## 2021-04-01 ENCOUNTER — APPOINTMENT (OUTPATIENT)
Dept: LAB | Facility: CLINIC | Age: 21
End: 2021-04-01
Attending: PEDIATRICS
Payer: COMMERCIAL

## 2021-04-07 ENCOUNTER — HOME INFUSION (PRE-WILLOW HOME INFUSION) (OUTPATIENT)
Dept: PHARMACY | Facility: CLINIC | Age: 21
End: 2021-04-07

## 2021-04-09 ENCOUNTER — HOME INFUSION (PRE-WILLOW HOME INFUSION) (OUTPATIENT)
Dept: PHARMACY | Facility: CLINIC | Age: 21
End: 2021-04-09

## 2021-04-12 NOTE — PROGRESS NOTES
This is a recent snapshot of the patient's [facility] medical record.  For current drug dose and complete information and questions, call [phone] or In Basket pool, fv home infusion ([id number])  CSN Number:  [id number]

## 2021-04-13 ENCOUNTER — DOCUMENTATION ONLY (OUTPATIENT)
Dept: PHARMACY | Facility: CLINIC | Age: 21
End: 2021-04-13

## 2021-04-13 ENCOUNTER — HOSPITAL ENCOUNTER (OUTPATIENT)
Facility: CLINIC | Age: 21
Setting detail: SPECIMEN
Discharge: HOME | End: 2021-04-13
Admitting: PEDIATRICS
Payer: COMMERCIAL

## 2021-04-13 ENCOUNTER — HOME INFUSION (PRE-WILLOW HOME INFUSION) (OUTPATIENT)
Dept: PHARMACY | Facility: CLINIC | Age: 21
End: 2021-04-13

## 2021-04-13 LAB
ALBUMIN SERPL-MCNC: 3.9 G/DL (ref 3.4–5)
ALP SERPL-CCNC: 70 U/L (ref 65–260)
ALT SERPL W P-5'-P-CCNC: 16 U/L (ref 0–50)
ANION GAP SERPL CALCULATED.3IONS-SCNC: 8 MMOL/L (ref 3–14)
AST SERPL W P-5'-P-CCNC: 30 U/L (ref 0–35)
BASOPHILS # BLD AUTO: 0.1 10E9/L (ref 0–0.2)
BASOPHILS NFR BLD AUTO: 0.9 %
BILIRUB SERPL-MCNC: 0.6 MG/DL (ref 0.2–1.3)
BUN SERPL-MCNC: 11 MG/DL (ref 7–30)
CALCIUM SERPL-MCNC: 8.4 MG/DL (ref 8.5–10.1)
CHLORIDE SERPL-SCNC: 109 MMOL/L (ref 98–110)
CO2 SERPL-SCNC: 24 MMOL/L (ref 20–32)
CREAT SERPL-MCNC: 0.62 MG/DL (ref 0.5–1)
CREAT UR-MCNC: 264 MG/DL
DIFFERENTIAL METHOD BLD: NORMAL
EOSINOPHIL # BLD AUTO: 0.1 10E9/L (ref 0–0.7)
EOSINOPHIL NFR BLD AUTO: 2.5 %
ERYTHROCYTE [DISTWIDTH] IN BLOOD BY AUTOMATED COUNT: 12.5 % (ref 10–15)
GFR SERPL CREATININE-BSD FRML MDRD: >90 ML/MIN/{1.73_M2}
GLUCOSE SERPL-MCNC: 107 MG/DL (ref 70–99)
HCT VFR BLD AUTO: 46 % (ref 40–53)
HGB BLD-MCNC: 15.6 G/DL (ref 13.3–17.7)
IMM GRANULOCYTES # BLD: 0 10E9/L (ref 0–0.4)
IMM GRANULOCYTES NFR BLD: 0.5 %
LYMPHOCYTES # BLD AUTO: 1.5 10E9/L (ref 0.8–5.3)
LYMPHOCYTES NFR BLD AUTO: 25.6 %
MCH RBC QN AUTO: 30.5 PG (ref 26.5–33)
MCHC RBC AUTO-ENTMCNC: 33.9 G/DL (ref 31.5–36.5)
MCV RBC AUTO: 90 FL (ref 78–100)
MICROALBUMIN UR-MCNC: 54 MG/L
MICROALBUMIN/CREAT UR: 20.42 MG/G CR (ref 0–17)
MONOCYTES # BLD AUTO: 0.5 10E9/L (ref 0–1.3)
MONOCYTES NFR BLD AUTO: 9.3 %
NEUTROPHILS # BLD AUTO: 3.5 10E9/L (ref 1.6–8.3)
NEUTROPHILS NFR BLD AUTO: 61.2 %
NRBC # BLD AUTO: 0 10*3/UL
NRBC BLD AUTO-RTO: 0 /100
PLATELET # BLD AUTO: 296 10E9/L (ref 150–450)
POTASSIUM SERPL-SCNC: 3.6 MMOL/L (ref 3.4–5.3)
PROT SERPL-MCNC: 6.9 G/DL (ref 6.8–8.8)
PROT UR-MCNC: 0.59 G/L
PROT/CREAT 24H UR: 0.22 G/G CR (ref 0–0.2)
RBC # BLD AUTO: 5.12 10E12/L (ref 4.4–5.9)
SODIUM SERPL-SCNC: 141 MMOL/L (ref 133–144)
WBC # BLD AUTO: 5.7 10E9/L (ref 4–11)

## 2021-04-13 PROCEDURE — 84156 ASSAY OF PROTEIN URINE: CPT | Performed by: PEDIATRICS

## 2021-04-13 PROCEDURE — 80053 COMPREHEN METABOLIC PANEL: CPT | Performed by: PEDIATRICS

## 2021-04-13 PROCEDURE — 85025 COMPLETE CBC W/AUTO DIFF WBC: CPT | Performed by: PEDIATRICS

## 2021-04-13 PROCEDURE — 82043 UR ALBUMIN QUANTITATIVE: CPT | Performed by: PEDIATRICS

## 2021-04-13 NOTE — PROGRESS NOTES
Skilled Nurse visit in the  patient home to administer Fabrazyme 110mg/500ml ns.  No recent elevated temperature, fever, chills, productive cough, coughing for 3 weeks or longer or hemoptysis, abnormal vital signs, night sweats, chest pain. No  decrease in your appetite, unexplained weight loss or fatigue.  No other new onset medical symptoms.  Current weight 220.  PIV placed left forearm, 1 attempt.  Pre medicated with acetaminophen 975mg po, solumedrol 40mg iv (first decreased dose from new weaning orders). Labs drawn CBC d/p, CMP, random urine protein and albumin quantitative with creat ratio. Infusion completed without complication or reaction. Pt reports therapy is effective in managing symptoms related to therapy.    Rudolph Lawrence RN   [facility]  [email or web address]  [phone]

## 2021-04-14 NOTE — PROGRESS NOTES
This is a recent snapshot of the patient's [facility] medical record.  For current drug dose and complete information and questions, call [phone] or In Basket pool, fv home infusion ([id number])  CSN Number:  [id number]

## 2021-04-15 NOTE — PROGRESS NOTES
This is a recent snapshot of the patient's [facility] medical record.  For current drug dose and complete information and questions, call [phone] or In Basket pool, fv home infusion ([id number])  CSN Number:  [id number]

## 2021-04-23 ENCOUNTER — HOME INFUSION (PRE-WILLOW HOME INFUSION) (OUTPATIENT)
Dept: PHARMACY | Facility: CLINIC | Age: 21
End: 2021-04-23

## 2021-04-26 NOTE — PROGRESS NOTES
This is a recent snapshot of the patient's [facility] medical record.  For current drug dose and complete information and questions, call [phone] or In Basket pool, fv home infusion ([id number])  CSN Number:  [id number]       Dapsone Pregnancy And Lactation Text: This medication is Pregnancy Category C and is not considered safe during pregnancy or breast feeding. Include Pregnancy/Lactation Warning?: No Benzoyl Peroxide Pregnancy And Lactation Text: This medication is Pregnancy Category C. It is unknown if benzoyl peroxide is excreted in breast milk. Bactrim Pregnancy And Lactation Text: This medication is Pregnancy Category D and is known to cause fetal risk. It is also excreted in breast milk. Erythromycin Pregnancy And Lactation Text: This medication is Pregnancy Category B and is considered safe during pregnancy. It is also excreted in breast milk. Spironolactone Counseling: Patient advised regarding risks of diarrhea, abdominal pain, hyperkalemia, birth defects (for female patients), liver toxicity and renal toxicity. The patient may need blood work to monitor liver and kidney function and potassium levels while on therapy. The patient verbalized understanding of the proper use and possible adverse effects of spironolactone. All of the patient's questions and concerns were addressed. Topical Retinoid counseling:  Patient advised to apply a pea-sized amount only at bedtime and wait 30 minutes after washing their face before applying. If too drying, patient may add a non-comedogenic moisturizer. The patient verbalized understanding of the proper use and possible adverse effects of retinoids. All of the patient's questions and concerns were addressed. Benzoyl Peroxide Counseling: Patient counseled that medicine may cause skin irritation and bleach clothing. In the event of skin irritation, the patient was advised to reduce the amount of the drug applied or use it less frequently. The patient verbalized understanding of the proper use and possible adverse effects of benzoyl peroxide. All of the patient's questions and concerns were addressed. Topical Clindamycin Counseling: Patient counseled that this medication may cause skin irritation or allergic reactions. In the event of skin irritation, the patient was advised to reduce the amount of the drug applied or use it less frequently. The patient verbalized understanding of the proper use and possible adverse effects of clindamycin. All of the patient's questions and concerns were addressed. Azithromycin Counseling:  I discussed with the patient the risks of azithromycin including but not limited to GI upset, allergic reaction, drug rash, diarrhea, and yeast infections. Minocycline Counseling: Patient advised regarding possible photosensitivity and discoloration of the teeth, skin, lips, tongue and gums. Patient instructed to avoid sunlight, if possible. When exposed to sunlight, patients should wear protective clothing, sunglasses, and sunscreen. The patient was instructed to call the office immediately if the following severe adverse effects occur:  hearing changes, easy bruising/bleeding, severe headache, or vision changes. The patient verbalized understanding of the proper use and possible adverse effects of minocycline. All of the patient's questions and concerns were addressed. Doxycycline Counseling:  Patient counseled regarding possible photosensitivity and increased risk for sunburn. Patient instructed to avoid sunlight, if possible. When exposed to sunlight, patients should wear protective clothing, sunglasses, and sunscreen. The patient was instructed to call the office immediately if the following severe adverse effects occur:  hearing changes, easy bruising/bleeding, severe headache, or vision changes. The patient verbalized understanding of the proper use and possible adverse effects of doxycycline. All of the patient's questions and concerns were addressed. Isotretinoin Counseling: Patient should get monthly blood tests, not donate blood, not drive at night if vision affected, not share medication, and not undergo elective surgery for 6 months after tx completed. Side effects reviewed, pt to contact office should one occur. Birth Control Pills Counseling: Birth Control Pill Counseling: I discussed with the patient the potential side effects of OCPs including but not limited to increased risk of stroke, heart attack, thrombophlebitis, deep venous thrombosis, hepatic adenomas, breast changes, GI upset, headaches, and depression. The patient verbalized understanding of the proper use and possible adverse effects of OCPs. All of the patient's questions and concerns were addressed. Tetracycline Pregnancy And Lactation Text: This medication is Pregnancy Category D and not consider safe during pregnancy. It is also excreted in breast milk. Topical Clindamycin Pregnancy And Lactation Text: This medication is Pregnancy Category B and is considered safe during pregnancy. It is unknown if it is excreted in breast milk. Birth Control Pills Pregnancy And Lactation Text: This medication should be avoided if pregnant and for the first 30 days post-partum. Doxycycline Pregnancy And Lactation Text: This medication is Pregnancy Category D and not consider safe during pregnancy. It is also excreted in breast milk but is considered safe for shorter treatment courses. Azithromycin Pregnancy And Lactation Text: This medication is considered safe during pregnancy and is also secreted in breast milk. Topical Retinoid Pregnancy And Lactation Text: This medication is Pregnancy Category C. It is unknown if this medication is excreted in breast milk. Isotretinoin Pregnancy And Lactation Text: This medication is Pregnancy Category X and is considered extremely dangerous during pregnancy. It is unknown if it is excreted in breast milk. High Dose Vitamin A Counseling: Side effects reviewed, pt to contact office should one occur. Tazorac Counseling:  Patient advised that medication is irritating and drying. Patient may need to apply sparingly and wash off after an hour before eventually leaving it on overnight. The patient verbalized understanding of the proper use and possible adverse effects of tazorac. All of the patient's questions and concerns were addressed. Tetracycline Counseling: Patient counseled regarding possible photosensitivity and increased risk for sunburn. Patient instructed to avoid sunlight, if possible. When exposed to sunlight, patients should wear protective clothing, sunglasses, and sunscreen. The patient was instructed to call the office immediately if the following severe adverse effects occur:  hearing changes, easy bruising/bleeding, severe headache, or vision changes. The patient verbalized understanding of the proper use and possible adverse effects of tetracycline. All of the patient's questions and concerns were addressed. Patient understands to avoid pregnancy while on therapy due to potential birth defects. Erythromycin Counseling:  I discussed with the patient the risks of erythromycin including but not limited to GI upset, allergic reaction, drug rash, diarrhea, increase in liver enzymes, and yeast infections. Topical Sulfur Applications Counseling: Topical Sulfur Counseling: Patient counseled that this medication may cause skin irritation or allergic reactions. In the event of skin irritation, the patient was advised to reduce the amount of the drug applied or use it less frequently. The patient verbalized understanding of the proper use and possible adverse effects of topical sulfur application. All of the patient's questions and concerns were addressed. Bactrim Counseling:  I discussed with the patient the risks of sulfa antibiotics including but not limited to GI upset, allergic reaction, drug rash, diarrhea, dizziness, photosensitivity, and yeast infections. Rarely, more serious reactions can occur including but not limited to aplastic anemia, agranulocytosis, methemoglobinemia, blood dyscrasias, liver or kidney failure, lung infiltrates or desquamative/blistering drug rashes. Dapsone Counseling: I discussed with the patient the risks of dapsone including but not limited to hemolytic anemia, agranulocytosis, rashes, methemoglobinemia, kidney failure, peripheral neuropathy, headaches, GI upset, and liver toxicity. Patients who start dapsone require monitoring including baseline LFTs and weekly CBCs for the first month, then every month thereafter. The patient verbalized understanding of the proper use and possible adverse effects of dapsone. All of the patient's questions and concerns were addressed. Detail Level: Zone High Dose Vitamin A Pregnancy And Lactation Text: High dose vitamin A therapy is contraindicated during pregnancy and breast feeding. Tazorac Pregnancy And Lactation Text: This medication is not safe during pregnancy. It is unknown if this medication is excreted in breast milk. Spironolactone Pregnancy And Lactation Text: This medication can cause feminization of the male fetus and should be avoided during pregnancy. The active metabolite is also found in breast milk. Topical Sulfur Applications Pregnancy And Lactation Text: This medication is Pregnancy Category C and has an unknown safety profile during pregnancy. It is unknown if this topical medication is excreted in breast milk.

## 2021-04-27 ENCOUNTER — HOME INFUSION (PRE-WILLOW HOME INFUSION) (OUTPATIENT)
Dept: PHARMACY | Facility: CLINIC | Age: 21
End: 2021-04-27

## 2021-04-27 ENCOUNTER — DOCUMENTATION ONLY (OUTPATIENT)
Dept: PHARMACY | Facility: CLINIC | Age: 21
End: 2021-04-27

## 2021-04-27 NOTE — PROGRESS NOTES
Skilled Nurse visit in the  patient home to administer Fabrazyme 110mg/500ml ns.  No recent elevated temperature, fever, chills, productive cough, coughing for 3 weeks or longer or hemoptysis, abnormal vital signs, night sweats, chest pain. No  decrease in your appetite, unexplained weight loss or fatigue.  No other new onset medical symptoms.  Current weight 216.  PIV placed right forearm, 2 attempts.  Pre medicated with acetaminophen 975mg po, solumedrol 40mg iv. Infusion completed without complication or reaction. Pt reports therapy is effective in managing symptoms related to therapy.    Rudolph Lawrence RN   [facility]  [email or web address]  [phone]

## 2021-04-28 NOTE — PROGRESS NOTES
This is a recent snapshot of the patient's [facility] medical record.  For current drug dose and complete information and questions, call [phone] or In Basket pool, fv home infusion ([id number])  CSN Number:  [id number]

## 2021-05-01 ENCOUNTER — APPOINTMENT (OUTPATIENT)
Dept: LAB | Facility: CLINIC | Age: 21
End: 2021-05-01
Attending: PEDIATRICS
Payer: COMMERCIAL

## 2021-05-07 ENCOUNTER — HOME INFUSION (PRE-WILLOW HOME INFUSION) (OUTPATIENT)
Dept: PHARMACY | Facility: CLINIC | Age: 21
End: 2021-05-07

## 2021-05-11 ENCOUNTER — DOCUMENTATION ONLY (OUTPATIENT)
Dept: PHARMACY | Facility: CLINIC | Age: 21
End: 2021-05-11

## 2021-05-11 ENCOUNTER — HOME INFUSION (PRE-WILLOW HOME INFUSION) (OUTPATIENT)
Dept: PHARMACY | Facility: CLINIC | Age: 21
End: 2021-05-11

## 2021-05-11 NOTE — PROGRESS NOTES
Skilled Nurse visit in the  patient home to administer Fabrazyme 110mg/500ml ns  No recent elevated temperature, fever, chills, productive cough, coughing for 3 weeks or longer or hemoptysis, abnormal vital signs, night sweats, chest pain. No  decrease in your appetite, unexplained weight loss or fatigue.  No other new onset medical symptoms.  Current weight 215  PIV placed RFA, 1 attempt/s.  Pre medicated with methylprednisolone 20mg iv and acetaminophen 975mg po. Infusion completed without complication or reaction. Pt reports therapy is effective in managing symptoms related to therapy.    Rudolph Lawrence RN   [facility]  [email or web address]  [phone]

## 2021-05-13 NOTE — PROGRESS NOTES
This is a recent snapshot of the patient's [facility] medical record.  For current drug dose and complete information and questions, call [phone] or In Basket pool, fv home infusion ([id number])  CSN Number:  [id number]

## 2021-05-18 NOTE — PROGRESS NOTES
This is a recent snapshot of the patient's [facility] medical record.  For current drug dose and complete information and questions, call [phone] or In Basket pool, fv home infusion ([id number])  CSN Number:  [id number]

## 2021-05-25 ENCOUNTER — DOCUMENTATION ONLY (OUTPATIENT)
Dept: PHARMACY | Facility: CLINIC | Age: 21
End: 2021-05-25

## 2021-05-25 ENCOUNTER — HOME INFUSION (PRE-WILLOW HOME INFUSION) (OUTPATIENT)
Dept: PHARMACY | Facility: CLINIC | Age: 21
End: 2021-05-25

## 2021-05-25 NOTE — PROGRESS NOTES
Skilled Nurse visit in the  patient home to administer Fabrazyme 110mg/500ml ns.  No recent elevated temperature, fever, chills, productive cough, coughing for 3 weeks or longer or hemoptysis, abnormal vital signs, night sweats, chest pain. No  decrease in your appetite, unexplained weight loss or fatigue.  No other new onset medical symptoms.  Current weight 217.  PIV placed left hand, 1 attempt.  Pre medicated with acetaminophen 975mg po and solumedrol 20mg iv. Infusion completed without complication or reaction. Pt reports therapy is effective in managing symptoms related to therapy.    Rudolph Lawrence RN   [facility]  [email or web address]  [phone]

## 2021-06-01 ENCOUNTER — APPOINTMENT (OUTPATIENT)
Dept: LAB | Facility: CLINIC | Age: 21
End: 2021-06-01
Attending: PEDIATRICS
Payer: COMMERCIAL

## 2021-06-08 ENCOUNTER — HOME INFUSION (PRE-WILLOW HOME INFUSION) (OUTPATIENT)
Dept: PHARMACY | Facility: CLINIC | Age: 21
End: 2021-06-08

## 2021-06-08 ENCOUNTER — DOCUMENTATION ONLY (OUTPATIENT)
Dept: PHARMACY | Facility: CLINIC | Age: 21
End: 2021-06-08

## 2021-06-08 NOTE — PROGRESS NOTES
Skilled Nurse visit in the  patient home to administer Fabrazyme 110mg/500ml ns.  No recent elevated temperature, fever, chills, productive cough, coughing for 3 weeks or longer or hemoptysis, abnormal vital signs, night sweats, chest pain. No  decrease in your appetite, unexplained weight loss or fatigue.  No other new onset medical symptoms.  Current weight 215.  PIV placed right forearm, 1 attempt.  Pre medicated with acetaminophen 975mg po. Infusion completed without complication or reaction. Pt reports therapy is effective in managing symptoms related to therapy.    Rudolph Lawrence RN   [facility]  [email or web address]  [phone]

## 2021-06-15 NOTE — PROGRESS NOTES
This is a recent snapshot of the patient's [facility] medical record.  For current drug dose and complete information and questions, call [phone] or In Basket pool, fv home infusion ([id number])  CSN Number:  [id number]

## 2021-06-18 ENCOUNTER — HOME INFUSION (PRE-WILLOW HOME INFUSION) (OUTPATIENT)
Dept: PHARMACY | Facility: CLINIC | Age: 21
End: 2021-06-18

## 2021-06-22 ENCOUNTER — DOCUMENTATION ONLY (OUTPATIENT)
Dept: PHARMACY | Facility: CLINIC | Age: 21
End: 2021-06-22

## 2021-06-22 ENCOUNTER — HOME INFUSION (PRE-WILLOW HOME INFUSION) (OUTPATIENT)
Dept: PHARMACY | Facility: CLINIC | Age: 21
End: 2021-06-22

## 2021-06-22 NOTE — PROGRESS NOTES
Skilled Nurse visit in the  patient home to administer Fabrazyme 110mg/500ml.  No recent elevated temperature, fever, chills, productive cough, coughing for 3 weeks or longer or hemoptysis, abnormal vital signs, night sweats, chest pain. No  decrease in your appetite, unexplained weight loss or fatigue.  No other new onset medical symptoms.  Current weight 215.  PIV placed left forearm, 1 attempt.  Pre medicated with acetaminophen 975mg po. Infusion completed without complication or reaction. Pt reports therapy is effective in managing/ preventing symptoms related to therapy.    Rudolph Lawrence RN   [facility]  [email or web address]  [phone]

## 2021-06-24 NOTE — PROGRESS NOTES
This is a recent snapshot of the patient's [facility] medical record.  For current drug dose and complete information and questions, call [phone] or In Basket pool, fv home infusion ([id number])  CSN Number: [id number]

## 2021-07-01 NOTE — PROGRESS NOTES
This is a recent snapshot of the patient's [facility] medical record.  For current drug dose and complete information and questions, call [phone] or In Basket pool, fv home infusion ([id number])  CSN Number:  [id number]

## 2021-07-02 ENCOUNTER — HOME INFUSION (PRE-WILLOW HOME INFUSION) (OUTPATIENT)
Dept: PHARMACY | Facility: CLINIC | Age: 21
End: 2021-07-02

## 2021-07-02 NOTE — PROGRESS NOTES
This is a recent snapshot of the patient's [facility] medical record.  For current drug dose and complete information and questions, call [phone] or In Basket pool, fv home infusion ([id number])  CSN Number:  [id number]

## 2021-07-06 ENCOUNTER — DOCUMENTATION ONLY (OUTPATIENT)
Dept: PHARMACY | Facility: CLINIC | Age: 21
End: 2021-07-06

## 2021-07-06 ENCOUNTER — MEDICAL CORRESPONDENCE (OUTPATIENT)
Dept: HEALTH INFORMATION MANAGEMENT | Facility: CLINIC | Age: 21
End: 2021-07-06

## 2021-07-06 ENCOUNTER — HOME INFUSION (PRE-WILLOW HOME INFUSION) (OUTPATIENT)
Dept: PHARMACY | Facility: CLINIC | Age: 21
End: 2021-07-06

## 2021-07-06 LAB
ALBUMIN SERPL-MCNC: 3.5 G/DL (ref 3.4–5)
ALP SERPL-CCNC: 65 U/L (ref 65–260)
ALT SERPL W P-5'-P-CCNC: 22 U/L (ref 0–50)
ANION GAP SERPL CALCULATED.3IONS-SCNC: 3 MMOL/L (ref 3–14)
AST SERPL W P-5'-P-CCNC: 20 U/L (ref 0–35)
BASOPHILS # BLD AUTO: 0 10E9/L (ref 0–0.2)
BASOPHILS NFR BLD AUTO: 0.8 %
BILIRUB SERPL-MCNC: 0.5 MG/DL (ref 0.2–1.3)
BUN SERPL-MCNC: 11 MG/DL (ref 7–30)
CALCIUM SERPL-MCNC: 8.2 MG/DL (ref 8.5–10.1)
CHLORIDE SERPL-SCNC: 111 MMOL/L (ref 98–110)
CO2 SERPL-SCNC: 25 MMOL/L (ref 20–32)
CREAT SERPL-MCNC: 0.63 MG/DL (ref 0.5–1)
CREAT UR-MCNC: 174 MG/DL
DIFFERENTIAL METHOD BLD: NORMAL
EOSINOPHIL # BLD AUTO: 0.2 10E9/L (ref 0–0.7)
EOSINOPHIL NFR BLD AUTO: 4.5 %
ERYTHROCYTE [DISTWIDTH] IN BLOOD BY AUTOMATED COUNT: 13.2 % (ref 10–15)
GFR SERPL CREATININE-BSD FRML MDRD: >90 ML/MIN/{1.73_M2}
GLUCOSE SERPL-MCNC: 90 MG/DL (ref 70–99)
HCT VFR BLD AUTO: 41.9 % (ref 40–53)
HGB BLD-MCNC: 13.7 G/DL (ref 13.3–17.7)
IMM GRANULOCYTES # BLD: 0 10E9/L (ref 0–0.4)
IMM GRANULOCYTES NFR BLD: 0.4 %
LYMPHOCYTES # BLD AUTO: 1.7 10E9/L (ref 0.8–5.3)
LYMPHOCYTES NFR BLD AUTO: 32.2 %
MCH RBC QN AUTO: 29.8 PG (ref 26.5–33)
MCHC RBC AUTO-ENTMCNC: 32.7 G/DL (ref 31.5–36.5)
MCV RBC AUTO: 91 FL (ref 78–100)
MICROALBUMIN UR-MCNC: 16 MG/L
MICROALBUMIN/CREAT UR: 9.48 MG/G CR (ref 0–17)
MONOCYTES # BLD AUTO: 0.5 10E9/L (ref 0–1.3)
MONOCYTES NFR BLD AUTO: 9.5 %
NEUTROPHILS # BLD AUTO: 2.8 10E9/L (ref 1.6–8.3)
NEUTROPHILS NFR BLD AUTO: 52.6 %
NRBC # BLD AUTO: 0 10*3/UL
NRBC BLD AUTO-RTO: 0 /100
PLATELET # BLD AUTO: 263 10E9/L (ref 150–450)
POTASSIUM SERPL-SCNC: 3.9 MMOL/L (ref 3.4–5.3)
PROT SERPL-MCNC: 6.4 G/DL (ref 6.8–8.8)
PROT UR-MCNC: 0.22 G/L
PROT/CREAT 24H UR: 0.13 G/G CR (ref 0–0.2)
RBC # BLD AUTO: 4.6 10E12/L (ref 4.4–5.9)
SODIUM SERPL-SCNC: 139 MMOL/L (ref 133–144)
WBC # BLD AUTO: 5.3 10E9/L (ref 4–11)

## 2021-07-06 PROCEDURE — 85025 COMPLETE CBC W/AUTO DIFF WBC: CPT | Performed by: PEDIATRICS

## 2021-07-06 PROCEDURE — 84156 ASSAY OF PROTEIN URINE: CPT | Performed by: PEDIATRICS

## 2021-07-06 PROCEDURE — 80053 COMPREHEN METABOLIC PANEL: CPT | Performed by: PEDIATRICS

## 2021-07-06 PROCEDURE — 82043 UR ALBUMIN QUANTITATIVE: CPT | Performed by: PEDIATRICS

## 2021-07-06 NOTE — PROGRESS NOTES
Skilled Nurse visit in the  patient home/I Infusion Suite to administer Fabrazyme 110mg/500ml ns.  No recent elevated temperature, fever, chills, productive cough, coughing for 3 weeks or longer or hemoptysis, abnormal vital signs, night sweats, chest pain. No  decrease in your appetite, unexplained weight loss or fatigue.  No other new onset medical symptoms.  Current weight 215.  PIV placed right AC, 1 attempt.  Pre medicated with APAP 975mg po. Labs drawn CBC d/p, CMP, random urine protein and albumin. Infusion completed without complication or reaction. Pt reports therapy is effective in managing symptoms related to therapy.    Rudolph Lawrence RN   [facility]  [email or web address]  [phone]

## 2021-07-09 NOTE — PROGRESS NOTES
This is a recent snapshot of the patient's [facility] medical record.  For current drug dose and complete information and questions, call [phone] or In [facility] pool, fv home infusion ([id number])  CSN Number:  [id number]  '

## 2021-07-12 NOTE — PROGRESS NOTES
This is a recent snapshot of the patient's [facility] medical record.  For current drug dose and complete information and questions, call [phone] or In Basket pool, fv home infusion ([id number])  CSN Number:  [id number]

## 2021-07-16 ENCOUNTER — HOME INFUSION (PRE-WILLOW HOME INFUSION) (OUTPATIENT)
Dept: PHARMACY | Facility: CLINIC | Age: 21
End: 2021-07-16

## 2021-07-20 ENCOUNTER — DOCUMENTATION ONLY (OUTPATIENT)
Dept: PHARMACY | Facility: CLINIC | Age: 21
End: 2021-07-20

## 2021-07-20 ENCOUNTER — HOME INFUSION (PRE-WILLOW HOME INFUSION) (OUTPATIENT)
Dept: PHARMACY | Facility: CLINIC | Age: 21
End: 2021-07-20

## 2021-07-20 NOTE — PROGRESS NOTES
Skilled Nurse visit in the  patient home to administer Fabrazyme 110mg/500ml ns.  No recent elevated temperature, fever, chills, productive cough, coughing for 3 weeks or longer or hemoptysis, abnormal vital signs, night sweats, chest pain. No  decrease in your appetite, unexplained weight loss or fatigue.  No other new onset medical symptoms.  Current weight 215.  PIV placed right hand, 1 attempt.  Pre medicated with acetaminophen 975mg po. Infusion completed without complication or reaction. Pt reports therapy is effective in managing symptoms related to therapy.    Rudolph Lawrence RN   [facility]  [email or web address]  [phone]

## 2021-07-26 NOTE — PROGRESS NOTES
This is a recent snapshot of the patient's [facility] medical record.  For current drug dose and complete information and questions, call [phone] or In Basket pool, fv home infusion ([id number])  CSN Number:  [id number]

## 2021-07-28 NOTE — PROGRESS NOTES
This is a recent snapshot of the patient's [facility] medical record.  For current drug dose and complete information and questions, call [phone] or In Basket pool, fv home infusion ([id number])  CSN Number:  [id number]

## 2021-07-30 ENCOUNTER — HOME INFUSION (PRE-WILLOW HOME INFUSION) (OUTPATIENT)
Dept: PHARMACY | Facility: CLINIC | Age: 21
End: 2021-07-30

## 2021-08-03 ENCOUNTER — HOME INFUSION (PRE-WILLOW HOME INFUSION) (OUTPATIENT)
Dept: PHARMACY | Facility: CLINIC | Age: 21
End: 2021-08-03

## 2021-08-03 ENCOUNTER — DOCUMENTATION ONLY (OUTPATIENT)
Dept: PHARMACY | Facility: CLINIC | Age: 21
End: 2021-08-03

## 2021-08-03 NOTE — PROGRESS NOTES
Skilled Nurse visit in the  patient home to administer Fabrazyme 110mg/500ml ns.  No recent elevated temperature, fever, chills, productive cough, coughing for 3 weeks or longer or hemoptysis, abnormal vital signs, night sweats, chest pain. No  decrease in your appetite, unexplained weight loss or fatigue.  No other new onset medical symptoms.  Current weight 215.  PIV placed right forearm, 1 attempt.  Pre medicated with acetaminophen 975mg po. Infusion completed without complication or reaction. Pt reports therapy is effective in managing symptoms related to therapy.    Rudolph Lawrence RN   [facility]  [email or web address]  [phone]

## 2021-08-09 NOTE — PROGRESS NOTES
This is a recent snapshot of the patient's [facility] medical record.  For current drug dose and complete information and questions, call [phone] or In Basket pool, fv home infusion ([id number])  CSN Number:  [id number]

## 2021-08-12 NOTE — PROGRESS NOTES
This is a recent snapshot of the patient's [facility] medical record.  For current drug dose and complete information and questions, call [phone] or In Basket pool, fv home infusion ([id number])  CSN Number:  [id number]

## 2021-08-13 ENCOUNTER — HOME INFUSION (PRE-WILLOW HOME INFUSION) (OUTPATIENT)
Dept: PHARMACY | Facility: CLINIC | Age: 21
End: 2021-08-13

## 2021-08-16 NOTE — PROGRESS NOTES
This is a recent snapshot of the patient's [facility] medical record.  For current drug dose and complete information and questions, call [phone] or In Basket pool, fv home infusion ([id number])  CSN Number:  [id number]

## 2021-08-17 ENCOUNTER — DOCUMENTATION ONLY (OUTPATIENT)
Dept: PHARMACY | Facility: CLINIC | Age: 21
End: 2021-08-17

## 2021-08-17 ENCOUNTER — HOME INFUSION (PRE-WILLOW HOME INFUSION) (OUTPATIENT)
Dept: PHARMACY | Facility: CLINIC | Age: 21
End: 2021-08-17

## 2021-08-17 NOTE — PROGRESS NOTES
Skilled Nurse visit in the  patient home to administer Fabrazyme 110mg/500ml ns  No recent elevated temperature, fever, chills, productive cough, coughing for 3 weeks or longer or hemoptysis, abnormal vital signs, night sweats, chest pain. No  decrease in your appetite, unexplained weight loss or fatigue.  No other new onset medical symptoms.  Current weight 217.  PIV placed left forearm, 2 attempts.  Pre medicated with acetaminophen 975mg po. Infusion completed without complication or reaction. Pt reports therapy is effective in managing symptoms related to therapy.    Rudolph Lawrence RN   [facility]  [email or web address]  [phone]

## 2021-08-24 NOTE — PROGRESS NOTES
This is a recent snapshot of the patient's [facility] medical record.  For current drug dose and complete information and questions, call [phone] or In Basket pool, fv home infusion ([id number])  CSN Number:  [id number]

## 2021-08-27 ENCOUNTER — HOME INFUSION (PRE-WILLOW HOME INFUSION) (OUTPATIENT)
Dept: PHARMACY | Facility: CLINIC | Age: 21
End: 2021-08-27

## 2021-08-31 ENCOUNTER — DOCUMENTATION ONLY (OUTPATIENT)
Dept: PHARMACY | Facility: CLINIC | Age: 21
End: 2021-08-31

## 2021-08-31 ENCOUNTER — HOME INFUSION (PRE-WILLOW HOME INFUSION) (OUTPATIENT)
Dept: PHARMACY | Facility: CLINIC | Age: 21
End: 2021-08-31

## 2021-09-08 NOTE — PROGRESS NOTES
This is a recent snapshot of the patient's [facility] medical record.  For current drug dose and complete information and questions, call [phone] or In Basket pool, fv home infusion ([id number])  CSN Number:  [id number]

## 2021-09-10 ENCOUNTER — HOME INFUSION (PRE-WILLOW HOME INFUSION) (OUTPATIENT)
Dept: PHARMACY | Facility: CLINIC | Age: 21
End: 2021-09-10

## 2021-09-13 NOTE — PROGRESS NOTES
This is a recent snapshot of the patient's [facility] medical record.  For current drug dose and complete information and questions, call [phone] or In Basket pool, fv home infusion ([id number])  CSN Number:  [id number]

## 2021-09-14 ENCOUNTER — DOCUMENTATION ONLY (OUTPATIENT)
Dept: PHARMACY | Facility: CLINIC | Age: 21
End: 2021-09-14

## 2021-09-14 ENCOUNTER — HOME INFUSION (PRE-WILLOW HOME INFUSION) (OUTPATIENT)
Dept: PHARMACY | Facility: CLINIC | Age: 21
End: 2021-09-14

## 2021-09-14 NOTE — PROGRESS NOTES
Skilled Nurse visit in the  patient home to administer Fabrazyme 110mg.  No recent elevated temperature, fever, chills, productive cough, coughing for 3 weeks or longer or hemoptysis, abnormal vital signs, night sweats, chest pain. No  decrease in your appetite, unexplained weight loss or fatigue.  No other new onset medical symptoms.  Current weight 215.  PIV placed right forearm, 1 attempt.  Pre medicated with acetaminophen 975mg po. Infusion completed without complication or reaction. Pt reports therapy is effective in managing symptoms related to therapy.    Rudolph Lawrence RN   [facility]  [email or web address]  [phone]

## 2021-09-22 NOTE — PROGRESS NOTES
This is a recent snapshot of the patient's [facility] medical record.  For current drug dose and complete information and questions, call [phone] or In Basket pool, fv home infusion ([id number])  CSN Number:  [id number]

## 2021-09-24 ENCOUNTER — HOME INFUSION (PRE-WILLOW HOME INFUSION) (OUTPATIENT)
Dept: PHARMACY | Facility: CLINIC | Age: 21
End: 2021-09-24

## 2021-09-28 ENCOUNTER — DOCUMENTATION ONLY (OUTPATIENT)
Dept: PHARMACY | Facility: CLINIC | Age: 21
End: 2021-09-28

## 2021-09-28 ENCOUNTER — HOME INFUSION (PRE-WILLOW HOME INFUSION) (OUTPATIENT)
Dept: PHARMACY | Facility: CLINIC | Age: 21
End: 2021-09-28

## 2021-09-28 NOTE — PROGRESS NOTES
Skilled Nurse visit in the  patient home to administer Fabrazyme 110mg/500ml ns.  No recent elevated temperature, fever, chills, productive cough, coughing for 3 weeks or longer or hemoptysis, abnormal vital signs, night sweats, chest pain. No  decrease in your appetite, unexplained weight loss or fatigue.  No other new onset medical symptoms.  Current weight 215.  PIV placed right median basilic. 1 attempt.  Pre medicated with acetaminophen 975mg po. Infusion completed without complication or reaction. Pt reports therapy is effective in managing symptoms related to therapy.    Rudolph Lawrence RN   [facility]  [email or web address]  [phone]

## 2021-09-29 NOTE — PROGRESS NOTES
This is a recent snapshot of the patient's [facility] medical record.  For current drug dose and complete information and questions, call [phone] or In Basket pool, fv home infusion ([id number])  CSN Number:  [id number]

## 2021-10-08 ENCOUNTER — HOME INFUSION (PRE-WILLOW HOME INFUSION) (OUTPATIENT)
Dept: PHARMACY | Facility: CLINIC | Age: 21
End: 2021-10-08

## 2021-10-12 ENCOUNTER — LAB REQUISITION (OUTPATIENT)
Dept: LAB | Facility: CLINIC | Age: 21
End: 2021-10-12
Payer: COMMERCIAL

## 2021-10-12 ENCOUNTER — HOME INFUSION (PRE-WILLOW HOME INFUSION) (OUTPATIENT)
Dept: PHARMACY | Facility: CLINIC | Age: 21
End: 2021-10-12
Payer: COMMERCIAL

## 2021-10-12 ENCOUNTER — DOCUMENTATION ONLY (OUTPATIENT)
Dept: PHARMACY | Facility: CLINIC | Age: 21
End: 2021-10-12

## 2021-10-12 DIAGNOSIS — E75.21 FABRY (-ANDERSON) DISEASE (H): ICD-10-CM

## 2021-10-12 LAB
ALBUMIN SERPL-MCNC: 3.6 G/DL (ref 3.4–5)
ALP SERPL-CCNC: 64 U/L (ref 40–150)
ALT SERPL W P-5'-P-CCNC: 20 U/L (ref 0–70)
ANION GAP SERPL CALCULATED.3IONS-SCNC: 4 MMOL/L (ref 3–14)
AST SERPL W P-5'-P-CCNC: 13 U/L (ref 0–45)
BASOPHILS # BLD AUTO: 0 10E3/UL (ref 0–0.2)
BASOPHILS NFR BLD AUTO: 1 %
BILIRUB SERPL-MCNC: 0.6 MG/DL (ref 0.2–1.3)
BUN SERPL-MCNC: 15 MG/DL (ref 7–30)
CALCIUM SERPL-MCNC: 8.3 MG/DL (ref 8.5–10.1)
CHLORIDE BLD-SCNC: 109 MMOL/L (ref 94–109)
CO2 SERPL-SCNC: 26 MMOL/L (ref 20–32)
CREAT SERPL-MCNC: 0.76 MG/DL (ref 0.66–1.25)
CREAT UR-MCNC: 114 MG/DL
CREAT UR-MCNC: 114 MG/DL
EOSINOPHIL # BLD AUTO: 0.1 10E3/UL (ref 0–0.7)
EOSINOPHIL NFR BLD AUTO: 2 %
ERYTHROCYTE [DISTWIDTH] IN BLOOD BY AUTOMATED COUNT: 12.5 % (ref 10–15)
GFR SERPL CREATININE-BSD FRML MDRD: >90 ML/MIN/1.73M2
GLUCOSE BLD-MCNC: 81 MG/DL (ref 70–99)
HCT VFR BLD AUTO: 43.9 % (ref 40–53)
HGB BLD-MCNC: 15.1 G/DL (ref 13.3–17.7)
HOLD SPECIMEN: NORMAL
HOLD SPECIMEN: NORMAL
IMM GRANULOCYTES # BLD: 0 10E3/UL
IMM GRANULOCYTES NFR BLD: 0 %
LYMPHOCYTES # BLD AUTO: 2.1 10E3/UL (ref 0.8–5.3)
LYMPHOCYTES NFR BLD AUTO: 34 %
MCH RBC QN AUTO: 31.1 PG (ref 26.5–33)
MCHC RBC AUTO-ENTMCNC: 34.4 G/DL (ref 31.5–36.5)
MCV RBC AUTO: 91 FL (ref 78–100)
MICROALBUMIN UR-MCNC: 28 MG/L
MICROALBUMIN/CREAT UR: 24.56 MG/G CR (ref 0–17)
MONOCYTES # BLD AUTO: 0.6 10E3/UL (ref 0–1.3)
MONOCYTES NFR BLD AUTO: 9 %
NEUTROPHILS # BLD AUTO: 3.3 10E3/UL (ref 1.6–8.3)
NEUTROPHILS NFR BLD AUTO: 54 %
NRBC # BLD AUTO: 0 10E3/UL
NRBC BLD AUTO-RTO: 0 /100
PLATELET # BLD AUTO: 278 10E3/UL (ref 150–450)
POTASSIUM BLD-SCNC: 3.7 MMOL/L (ref 3.4–5.3)
PROT SERPL-MCNC: 6.8 G/DL (ref 6.8–8.8)
PROT UR-MCNC: 0.21 G/L
PROT/CREAT 24H UR: 0.18 G/G CR (ref 0–0.2)
RBC # BLD AUTO: 4.85 10E6/UL (ref 4.4–5.9)
SODIUM SERPL-SCNC: 139 MMOL/L (ref 133–144)
WBC # BLD AUTO: 6.2 10E3/UL (ref 4–11)

## 2021-10-12 PROCEDURE — 85025 COMPLETE CBC W/AUTO DIFF WBC: CPT | Performed by: PEDIATRICS

## 2021-10-12 PROCEDURE — 84156 ASSAY OF PROTEIN URINE: CPT | Performed by: PEDIATRICS

## 2021-10-12 PROCEDURE — 80053 COMPREHEN METABOLIC PANEL: CPT | Performed by: PEDIATRICS

## 2021-10-12 PROCEDURE — 82043 UR ALBUMIN QUANTITATIVE: CPT | Performed by: PEDIATRICS

## 2021-10-12 NOTE — PROGRESS NOTES
Skilled Nurse visit in the  patient home to administer Fabrazyme 110mg/500ml ns over 5 hours.  No recent elevated temperature, fever, chills, productive cough, coughing for 3 weeks or longer or hemoptysis, abnormal vital signs, night sweats, chest pain. No  decrease in your appetite, unexplained weight loss or fatigue.  No other new onset medical symptoms.  Current weight 215.  PIV placed right A/C, 1 attempt.  Pre medicated with acetaminophen 975mg po. Labs drawn CBC d/p, CMP, random urine protein, albumin random urine quantitative with creat ratio. Infusion completed without complication or reaction. Pt reports therapy is effective in managing symptoms related to therapy.    Rudolph Lawrence RN   [facility]  [email or web address]  [phone]

## 2021-10-20 ENCOUNTER — HOME INFUSION (PRE-WILLOW HOME INFUSION) (OUTPATIENT)
Dept: PHARMACY | Facility: CLINIC | Age: 21
End: 2021-10-20

## 2021-10-21 NOTE — PROGRESS NOTES
This is a recent snapshot of the patient's [facility] medical record.  For current drug dose and complete information and questions, call [phone] or In Basket pool, fv home infusion ([id number])  CSN Number:  [id number]

## 2021-10-26 ENCOUNTER — DOCUMENTATION ONLY (OUTPATIENT)
Dept: PHARMACY | Facility: CLINIC | Age: 21
End: 2021-10-26

## 2021-10-26 ENCOUNTER — HOME INFUSION (PRE-WILLOW HOME INFUSION) (OUTPATIENT)
Dept: PHARMACY | Facility: CLINIC | Age: 21
End: 2021-10-26
Payer: COMMERCIAL

## 2021-10-26 NOTE — PROGRESS NOTES
Skilled Nurse visit in the  patient home to administer Fabrazyme 110mg/500ml ns over 5 hours.  No recent elevated temperature, fever, chills, productive cough, coughing for 3 weeks or longer or hemoptysis, abnormal vital signs, night sweats, chest pain. No  decrease in your appetite, unexplained weight loss or fatigue.  No other new onset medical symptoms.  Current weight 220.  PIV placed left forearm, 1 attempt.  Pre medicated with acetaminophen 975mg po. Infusion completed without complication or reaction. Pt reports therapy is effective in managing symptoms related to therapy.    Rudolph Lawrence RN   [facility]  [email or web address]  [phone]

## 2021-10-27 NOTE — PROGRESS NOTES
This is a recent snapshot of the patient's [facility] medical record.  For current drug dose and complete information and questions, call [phone] or In Basket pool, fv home infusion ([id number])  CSN Number:  [id number]

## 2021-11-05 ENCOUNTER — HOME INFUSION (PRE-WILLOW HOME INFUSION) (OUTPATIENT)
Dept: PHARMACY | Facility: CLINIC | Age: 21
End: 2021-11-05
Payer: COMMERCIAL

## 2021-11-05 NOTE — PROGRESS NOTES
This is a recent snapshot of the patient's [facility] medical record.  For current drug dose and complete information and questions, call [phone] or In Basket pool, fv home infusion ([id number])  CSN Number:  [id number]

## 2021-11-09 ENCOUNTER — DOCUMENTATION ONLY (OUTPATIENT)
Dept: PHARMACY | Facility: CLINIC | Age: 21
End: 2021-11-09
Payer: COMMERCIAL

## 2021-11-09 ENCOUNTER — HOME INFUSION (PRE-WILLOW HOME INFUSION) (OUTPATIENT)
Dept: PHARMACY | Facility: CLINIC | Age: 21
End: 2021-11-09
Payer: COMMERCIAL

## 2021-11-09 NOTE — PROGRESS NOTES
Skilled Nurse visit in the  patient home to administer Fabrazyme 110mg/500ml ns.  No recent elevated temperature, fever, chills, productive cough, coughing for 3 weeks or longer or hemoptysis, abnormal vital signs, night sweats, chest pain. No  decrease in your appetite, unexplained weight loss or fatigue.  No other new onset medical symptoms.  Current weight 220.  PIV placed left forearm, 1 attempts.  Pre medicated with acetaminophen 975mg po.  Infusion completed without complication or reaction. Pt reports therapy is effective in managing symptoms related to therapy.    Rudolph Lawrence RN   [facility]  [email or web address]  [phone]

## 2021-11-10 NOTE — PROGRESS NOTES
This is a recent snapshot of the patient's [facility] medical record.  For current drug dose and complete information and questions, call [phone] or In Basket pool, fv home infusion ([id number])  CSN Number:  [id number]

## 2021-11-17 ENCOUNTER — HOME INFUSION (PRE-WILLOW HOME INFUSION) (OUTPATIENT)
Dept: PHARMACY | Facility: CLINIC | Age: 21
End: 2021-11-17
Payer: COMMERCIAL

## 2021-11-21 ENCOUNTER — HOME INFUSION (PRE-WILLOW HOME INFUSION) (OUTPATIENT)
Dept: PHARMACY | Facility: CLINIC | Age: 21
End: 2021-11-21
Payer: COMMERCIAL

## 2021-11-23 NOTE — PROGRESS NOTES
This is a recent snapshot of the patient's [facility] medical record.  For current drug dose and complete information and questions, call [phone] or In Basket pool, fv home infusion ([id number])  CSN Number:  [id number]

## 2021-12-07 ENCOUNTER — HOME INFUSION (PRE-WILLOW HOME INFUSION) (OUTPATIENT)
Dept: PHARMACY | Facility: CLINIC | Age: 21
End: 2021-12-07
Payer: COMMERCIAL

## 2021-12-07 ENCOUNTER — DOCUMENTATION ONLY (OUTPATIENT)
Dept: PHARMACY | Facility: CLINIC | Age: 21
End: 2021-12-07
Payer: COMMERCIAL

## 2021-12-07 NOTE — PROGRESS NOTES
Skilled Nurse visit in the  patient home to administer Fabrazyme 110mg/500ml over 5 hours.  No recent elevated temperature, fever, chills, productive cough, coughing for 3 weeks or longer or hemoptysis, abnormal vital signs, night sweats, chest pain. No  decrease in your appetite, unexplained weight loss or fatigue.  No other new onset medical symptoms.  Current weight 220,  PIV placed right AC, 1 attempt.  Pre medicated with acetaminophen 975mg po.. Infusion completed after complication (at start of third hour of infusion) of mild reaction Joel had temp elevation to 101.5 withBP decrease from normal BP range of 115-125/ 65-75 to /58-low 60's. Dr Rod informed and managed treatment and resumption of infusion after return to normal VS. Solucortef 100mg, Benedryl 50mg iv and acetaminophen 500mg po given .  Pt reports therapy is necessary in managing symptoms related to therapy.    Rudolph Lawrence RN   [facility]  [email or web address]  [phone]

## 2021-12-08 ENCOUNTER — HOME INFUSION (PRE-WILLOW HOME INFUSION) (OUTPATIENT)
Dept: PHARMACY | Facility: CLINIC | Age: 21
End: 2021-12-08
Payer: COMMERCIAL

## 2021-12-09 ENCOUNTER — HOME INFUSION (PRE-WILLOW HOME INFUSION) (OUTPATIENT)
Dept: PHARMACY | Facility: CLINIC | Age: 21
End: 2021-12-09
Payer: COMMERCIAL

## 2021-12-10 NOTE — PROGRESS NOTES
This is a recent snapshot of the patient's [facility] medical record.  For current drug dose and complete information and questions, call [phone] or In Basket pool, fv home infusion ([id number])  CSN Number:  [id number]

## 2021-12-15 DIAGNOSIS — E75.21 FABRY DISEASE (H): Primary | ICD-10-CM

## 2021-12-15 RX ORDER — LORATADINE 10 MG/1
10 TABLET ORAL DAILY
Qty: 30 TABLET | Refills: 11 | Status: SHIPPED | OUTPATIENT
Start: 2021-12-15

## 2021-12-17 ENCOUNTER — HOME INFUSION (PRE-WILLOW HOME INFUSION) (OUTPATIENT)
Dept: PHARMACY | Facility: CLINIC | Age: 21
End: 2021-12-17
Payer: COMMERCIAL

## 2021-12-20 ENCOUNTER — HOME INFUSION (PRE-WILLOW HOME INFUSION) (OUTPATIENT)
Dept: PHARMACY | Facility: CLINIC | Age: 21
End: 2021-12-20
Payer: COMMERCIAL

## 2021-12-20 RX ORDER — LORATADINE 10 MG/1
10 TABLET ORAL DAILY
Status: CANCELLED
Start: 2021-12-20

## 2021-12-20 RX ORDER — METHYLPREDNISOLONE SODIUM SUCCINATE 40 MG/ML
40 INJECTION, POWDER, LYOPHILIZED, FOR SOLUTION INTRAMUSCULAR; INTRAVENOUS ONCE
Status: CANCELLED
Start: 2021-12-20 | End: 2021-12-20

## 2021-12-22 ENCOUNTER — HOME INFUSION (PRE-WILLOW HOME INFUSION) (OUTPATIENT)
Dept: PHARMACY | Facility: CLINIC | Age: 21
End: 2021-12-22
Payer: COMMERCIAL

## 2021-12-23 ENCOUNTER — DOCUMENTATION ONLY (OUTPATIENT)
Dept: PHARMACY | Facility: CLINIC | Age: 21
End: 2021-12-23
Payer: COMMERCIAL

## 2021-12-23 ENCOUNTER — HOME INFUSION (PRE-WILLOW HOME INFUSION) (OUTPATIENT)
Dept: PHARMACY | Facility: CLINIC | Age: 21
End: 2021-12-23
Payer: COMMERCIAL

## 2021-12-23 NOTE — PROGRESS NOTES
Skilled Nurse visit in the  patient home to administer Fabrazyme 110mg/500ml diluent  No recent elevated temperature, fever, chills, productive cough, coughing for 3 weeks or longer or hemoptysis, abnormal vital signs, night sweats, chest pain. No  decrease in your appetite, unexplained weight loss or fatigue.  No other new onset medical symptoms.  Current weight 220.  PIV placed right A/C, 2 attempts.  Pre medicated with acetaminophen 975mg po, solumedrol 40mg iv. Infusion completed without complication or reaction. Pt reports therapy is effective in managing symptoms related to therapy.    Rudolph Lawrence RN   [facility]  [email or web address]  [phone]

## 2021-12-24 ENCOUNTER — HOME INFUSION (PRE-WILLOW HOME INFUSION) (OUTPATIENT)
Dept: PHARMACY | Facility: CLINIC | Age: 21
End: 2021-12-24
Payer: COMMERCIAL

## 2021-12-29 NOTE — PROGRESS NOTES
This is a recent snapshot of the patient's [facility] medical record.  For current drug dose and complete information and questions, call [phone] or In Basket pool, fv home infusion ([id number])  CSN Number:  [id number]

## 2021-12-31 ENCOUNTER — HOME INFUSION (PRE-WILLOW HOME INFUSION) (OUTPATIENT)
Dept: PHARMACY | Facility: CLINIC | Age: 21
End: 2021-12-31
Payer: COMMERCIAL

## 2022-01-04 ENCOUNTER — LAB REQUISITION (OUTPATIENT)
Dept: LAB | Facility: CLINIC | Age: 22
End: 2022-01-04
Payer: COMMERCIAL

## 2022-01-04 ENCOUNTER — HOME INFUSION (PRE-WILLOW HOME INFUSION) (OUTPATIENT)
Dept: PHARMACY | Facility: CLINIC | Age: 22
End: 2022-01-04
Payer: COMMERCIAL

## 2022-01-04 ENCOUNTER — DOCUMENTATION ONLY (OUTPATIENT)
Dept: PHARMACY | Facility: CLINIC | Age: 22
End: 2022-01-04

## 2022-01-04 DIAGNOSIS — E75.21 FABRY (-ANDERSON) DISEASE (H): ICD-10-CM

## 2022-01-04 LAB
ALBUMIN SERPL-MCNC: 3.5 G/DL (ref 3.4–5)
ALP SERPL-CCNC: 61 U/L (ref 40–150)
ALT SERPL W P-5'-P-CCNC: 24 U/L (ref 0–70)
ANION GAP SERPL CALCULATED.3IONS-SCNC: 8 MMOL/L (ref 3–14)
AST SERPL W P-5'-P-CCNC: 23 U/L (ref 0–45)
BASOPHILS # BLD AUTO: 0 10E3/UL (ref 0–0.2)
BASOPHILS NFR BLD AUTO: 0 %
BILIRUB SERPL-MCNC: 0.8 MG/DL (ref 0.2–1.3)
BUN SERPL-MCNC: 13 MG/DL (ref 7–30)
CALCIUM SERPL-MCNC: 8.5 MG/DL (ref 8.5–10.1)
CHLORIDE BLD-SCNC: 110 MMOL/L (ref 94–109)
CO2 SERPL-SCNC: 24 MMOL/L (ref 20–32)
CREAT SERPL-MCNC: 0.6 MG/DL (ref 0.66–1.25)
CREAT UR-MCNC: 132 MG/DL
CREAT UR-MCNC: 132 MG/DL
EOSINOPHIL # BLD AUTO: 0.2 10E3/UL (ref 0–0.7)
EOSINOPHIL NFR BLD AUTO: 2 %
ERYTHROCYTE [DISTWIDTH] IN BLOOD BY AUTOMATED COUNT: 12.5 % (ref 10–15)
GFR SERPL CREATININE-BSD FRML MDRD: >90 ML/MIN/1.73M2
GLUCOSE BLD-MCNC: 83 MG/DL (ref 70–99)
HCT VFR BLD AUTO: 43.2 % (ref 40–53)
HGB BLD-MCNC: 14.6 G/DL (ref 13.3–17.7)
HOLD SPECIMEN: NORMAL
IMM GRANULOCYTES # BLD: 0 10E3/UL
IMM GRANULOCYTES NFR BLD: 1 %
LYMPHOCYTES # BLD AUTO: 3 10E3/UL (ref 0.8–5.3)
LYMPHOCYTES NFR BLD AUTO: 39 %
MCH RBC QN AUTO: 30.6 PG (ref 26.5–33)
MCHC RBC AUTO-ENTMCNC: 33.8 G/DL (ref 31.5–36.5)
MCV RBC AUTO: 91 FL (ref 78–100)
MICROALBUMIN UR-MCNC: 13 MG/L
MICROALBUMIN/CREAT UR: 9.85 MG/G CR (ref 0–17)
MONOCYTES # BLD AUTO: 0.6 10E3/UL (ref 0–1.3)
MONOCYTES NFR BLD AUTO: 8 %
NEUTROPHILS # BLD AUTO: 3.9 10E3/UL (ref 1.6–8.3)
NEUTROPHILS NFR BLD AUTO: 50 %
NRBC # BLD AUTO: 0 10E3/UL
NRBC BLD AUTO-RTO: 0 /100
PLATELET # BLD AUTO: 250 10E3/UL (ref 150–450)
POTASSIUM BLD-SCNC: 3.6 MMOL/L (ref 3.4–5.3)
PROT SERPL-MCNC: 6.5 G/DL (ref 6.8–8.8)
PROT UR-MCNC: 0.19 G/L
PROT/CREAT 24H UR: 0.14 G/G CR (ref 0–0.2)
RBC # BLD AUTO: 4.77 10E6/UL (ref 4.4–5.9)
SODIUM SERPL-SCNC: 142 MMOL/L (ref 133–144)
WBC # BLD AUTO: 7.8 10E3/UL (ref 4–11)

## 2022-01-04 PROCEDURE — 80053 COMPREHEN METABOLIC PANEL: CPT | Performed by: PEDIATRICS

## 2022-01-04 PROCEDURE — 85004 AUTOMATED DIFF WBC COUNT: CPT | Performed by: PEDIATRICS

## 2022-01-04 PROCEDURE — 82043 UR ALBUMIN QUANTITATIVE: CPT | Performed by: PEDIATRICS

## 2022-01-04 PROCEDURE — 84156 ASSAY OF PROTEIN URINE: CPT | Performed by: PEDIATRICS

## 2022-01-04 NOTE — PROGRESS NOTES
Skilled Nurse visit in the  patient home to administer Fabrazyme 110mg/500ml diluent over approximately 5 hours.  No recent elevated temperature, fever, chills, productive cough, coughing for 3 weeks or longer or hemoptysis, abnormal vital signs, night sweats, chest pain. No  decrease in your appetite, unexplained weight loss or fatigue.  No other new onset medical symptoms.  Current weight 220.  PIV placed right hand, 1 attempt.  Pre medicated with acetaminophen 975mg po, methylprednisolone 40mg iv. Labs drawn CBC d/p, CMP, random urine protein, albumin random urine quantitative with creat ratio.  Infusion completed without complication or reaction. Pt reports therapy is effective in managing symptoms related to therapy.    Rudolph Lawrence RN   [facility]  [email or web address]  [phone]

## 2022-01-13 ENCOUNTER — HOME INFUSION (PRE-WILLOW HOME INFUSION) (OUTPATIENT)
Dept: PHARMACY | Facility: CLINIC | Age: 22
End: 2022-01-13
Payer: COMMERCIAL

## 2022-01-18 ENCOUNTER — HOME INFUSION (PRE-WILLOW HOME INFUSION) (OUTPATIENT)
Dept: PHARMACY | Facility: CLINIC | Age: 22
End: 2022-01-18

## 2022-01-27 ENCOUNTER — HOME INFUSION (PRE-WILLOW HOME INFUSION) (OUTPATIENT)
Dept: PHARMACY | Facility: CLINIC | Age: 22
End: 2022-01-27

## 2022-02-01 ENCOUNTER — HOME INFUSION (PRE-WILLOW HOME INFUSION) (OUTPATIENT)
Dept: PHARMACY | Facility: CLINIC | Age: 22
End: 2022-02-01
Payer: COMMERCIAL

## 2022-02-10 ENCOUNTER — HOME INFUSION (PRE-WILLOW HOME INFUSION) (OUTPATIENT)
Dept: PHARMACY | Facility: CLINIC | Age: 22
End: 2022-02-10

## 2022-02-14 NOTE — PROGRESS NOTES
This is a recent snapshot of the patient's [facility] medical record.  For current drug dose and complete information and questions, call [phone] or In Basket pool, fv home infusion ([id number])  CSN Number:  [id number]

## 2022-02-14 NOTE — PROGRESS NOTES
This is a recent snapshot of the patient's [facility] medical record.  For current drug dose and complete information and questions, call [phone] or In Basket pool, fv home infusion ([id number])  CSN Number:  [id number]

## 2022-02-15 ENCOUNTER — HOME INFUSION (PRE-WILLOW HOME INFUSION) (OUTPATIENT)
Dept: PHARMACY | Facility: CLINIC | Age: 22
End: 2022-02-15

## 2022-02-15 ENCOUNTER — DOCUMENTATION ONLY (OUTPATIENT)
Dept: PHARMACY | Facility: CLINIC | Age: 22
End: 2022-02-15
Payer: COMMERCIAL

## 2022-02-15 NOTE — PROGRESS NOTES
Skilled Nurse visit in the  patient home to administer Fabrazyme 110mg/500ml ns.  No recent elevated temperature, fever, chills, productive cough, coughing for 3 weeks or longer or hemoptysis, abnormal vital signs, night sweats, chest pain. No  decrease in your appetite, unexplained weight loss or fatigue.  No other new onset medical symptoms.  Current weight 225.  PIV placed right mid forearm, 1 attempt.  Pre medicated with acetaminophen 975mg po and solumedrol 40mg iv.  Infusion completed without complication or reaction. Pt reports therapy is effective in managing symptoms related to therapy.    Rudolph Lawrence RN   [facility]  [email or web address]  [phone]

## 2022-02-25 ENCOUNTER — HOME INFUSION (PRE-WILLOW HOME INFUSION) (OUTPATIENT)
Dept: PHARMACY | Facility: CLINIC | Age: 22
End: 2022-02-25

## 2022-03-01 ENCOUNTER — HOME INFUSION (PRE-WILLOW HOME INFUSION) (OUTPATIENT)
Dept: PHARMACY | Facility: CLINIC | Age: 22
End: 2022-03-01

## 2022-03-02 NOTE — PROGRESS NOTES
This is a recent snapshot of the patient's [facility] medical record.  For current drug dose and complete information and questions, call [phone] or In Basket pool, fv home infusion ([id number])  CSN Number:  [id number]

## 2022-03-03 ENCOUNTER — HOME INFUSION (PRE-WILLOW HOME INFUSION) (OUTPATIENT)
Dept: PHARMACY | Facility: CLINIC | Age: 22
End: 2022-03-03
Payer: COMMERCIAL

## 2022-03-04 NOTE — PROGRESS NOTES
This is a recent snapshot of the patient's [facility] medical record.  For current drug dose and complete information and questions, call [phone] or In Basket pool, fv home infusion ([id number])  CSN Number:  [id number]

## 2022-03-05 NOTE — PROGRESS NOTES
This is a recent snapshot of the patient's [facility] medical record.  For current drug dose and complete information and questions, call [phone] or In Basket pool, fv home infusion ([id number])  CSN Number:  [id number]

## 2022-03-08 NOTE — PROGRESS NOTES
This is a recent snapshot of the patient's [facility] medical record.  For current drug dose and complete information and questions, call [phone] or In Basket pool, fv home infusion ([id number])  CSN Number:  [id number]

## 2022-03-11 ENCOUNTER — HOME INFUSION (PRE-WILLOW HOME INFUSION) (OUTPATIENT)
Dept: PHARMACY | Facility: CLINIC | Age: 22
End: 2022-03-11
Payer: COMMERCIAL

## 2022-03-14 NOTE — PROGRESS NOTES
This is a recent snapshot of the patient's [facility] medical record.  For current drug dose and complete information and questions, call [phone] or In Basket pool, fv home infusion ([id number])  CSN Number:  [id number]

## 2022-03-15 ENCOUNTER — HOME INFUSION (PRE-WILLOW HOME INFUSION) (OUTPATIENT)
Dept: PHARMACY | Facility: CLINIC | Age: 22
End: 2022-03-15
Payer: COMMERCIAL

## 2022-03-16 ENCOUNTER — HOME INFUSION (PRE-WILLOW HOME INFUSION) (OUTPATIENT)
Dept: PHARMACY | Facility: CLINIC | Age: 22
End: 2022-03-16
Payer: COMMERCIAL

## 2022-03-16 NOTE — PROGRESS NOTES
This is a recent snapshot of the patient's [facility] medical record.  For current drug dose and complete information and questions, call [phone] or In Basket pool, fv home infusion ([id number])  CSN Number:  [id number]

## 2022-03-18 NOTE — PROGRESS NOTES
This is a recent snapshot of the patient's [facility] medical record.  For current drug dose and complete information and questions, call [phone] or In Basket pool, fv home infusion ([id number])  CSN Number:  [id number]

## 2022-03-20 PROBLEM — E75.21 FABRY DISEASE (H): Status: ACTIVE | Noted: 2017-10-18

## 2022-03-21 ENCOUNTER — VIRTUAL VISIT (OUTPATIENT)
Dept: CONSULT | Facility: CLINIC | Age: 22
End: 2022-03-21
Attending: PEDIATRICS
Payer: COMMERCIAL

## 2022-03-21 VITALS — WEIGHT: 215 LBS | BODY MASS INDEX: 28.49 KG/M2 | HEIGHT: 73 IN

## 2022-03-21 DIAGNOSIS — E75.21 FABRY DISEASE (H): Primary | ICD-10-CM

## 2022-03-21 PROCEDURE — 99213 OFFICE O/P EST LOW 20 MIN: CPT | Mod: 95 | Performed by: PEDIATRICS

## 2022-03-21 ASSESSMENT — PAIN SCALES - GENERAL: PAINLEVEL: NO PAIN (0)

## 2022-03-21 NOTE — LETTER
"  3/21/2022      RE: Joel Lundberg  [address] [facility] [address]       Joel is a 21 year old who is being evaluated via a billable video visit.      How would you like to obtain your AVS? Mail a copy  If the video visit is dropped, the invitation should be resent by: Send to e-mail at: [email or web address]  Will anyone else be joining your video visit? Yes. Dr. Hortencia Mcnally MD. (Clinical physician), Dr.Todd Almonte, PharmD. (Pharmacist) and NADIA Farris. (Research Physician)       Niyah Galarza M.A.  Video-Visit Details    Type of service:  Video Visit    Video Start Time: 10:40 AM  Video End Time (time video stopped): 11:23 AM    Originating Location (pt. Location): Home    Distant Location (provider location):  [facility]    Mode of Communication:  Video Conference via [facility]                [employer] [address] [address]   Phone: [phone]  Fax: [phone]  Date: 2022      Patient:  Joel Lundberg   :   2000   MRN:     [id number]      Joel Lundberg  [address] [facility] [address]    Dear Dr. Yariel Carty and Joel Lundberg,    Thank you for sending Joel Lundberg to the [facility] Monday \"Advanced Therapies Clinic\" for consultation and treatment of: Fabry disease.      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 His Fabry disease is managed through Fabrazyme infusion 110 mg every other week which he started since 2018. He does not report any problems with his infusions now. Joel does not complain of any headaches, " "numbness of hands or feet, GI issues or any new angiokeratomas. Since the last visit, there have been no hospitalizations or emergency department visits. He had his [employer] Covid-19 vaccine.    Medications:  Current Outpatient Medications   Medication Sig     agalsidase beta (FABRAZYME) 5 MG Inject 90 mg into the vein every 14 days Please see Epic Therapy Plan for infusion order details.     Cholecalciferol (VITAMIN D-3 PO) Take 25,000 Int'l Units by mouth once a week     loratadine (CLARITIN) 10 MG tablet Take 1 tablet (10 mg) by mouth daily     Pseudoeph-Doxylamine-DM-APAP (NYQUIL PO) Take by mouth At Bedtime     No current facility-administered medications for this visit.       Allergies:  Allergies   Allergen Reactions     Sulfa Drugs        Physical Examination:  Height 6' 1.23\" (186 cm), weight 215 lb (97.5 kg).  Weight %tile:Facility age limit for growth percentiles is 20 years.  Height %tile: Facility age limit for growth percentiles is 20 years.  Head Circumference %tile: Facility age limit for growth percentiles is 20 years.  BMI %tile: Facility age limit for growth percentiles is 20 years.    FAMILY HISTORY: A brief family medical history was reviewed.  REVIEW OF SYSTEMS: The review of systems negative for new eye, ear, heart, lung, liver, spleen, gastrointestinal, bone, muscle, integumentary, endocrinologic, brain or psychiatric issues except as noted above.  PHYSICAL EXAMINATION:   General: The patient is oriented to person, place and time at an age-appropriate manner.   HEENT: The facial features are normal and symmetric. The ears are of normal position and configuration and hearing is grossly normal.  The tongue protrudes normally without fasciculations.  Neck: The neck  appears to be supple with full range of motion  Chest: The chest is of normal configuration. There is no tachypnea or other visible abnormalies.  Heart: The patient appears to be well perfused, and in no apparent " distress.  Abdomen: Not examined.  Extremities: The extremities are of normal configuration.  Back: Not examined,  Integument: The  visible portion of the integument is  of normal appearance without significant changes in pigmentation, birthmarks, or lesions.  Neuromuscular:  Mental Status Exam: Alert, awake. Fully oriented. No dysarthria; speech of normal fluency.  Cranial Nerves: EOMs intact, no nystagmus, facial movements symmetric.   Motor: There appears to be normal movement in all four extremities, no atrophy or fasciculations. No tremors.  Gait: By visual examination, there appears to be normal gait; normal arm swing and stance.    LABORATORY RESULTS: Laboratory studies from the past year were reviewed.    Date Plasma GL3  Reference Range:  1.34 - 4.04 mcg/mL Lyso GL3  Reference Range:  <0.30 ng/mL Anti Fabrazyme Antibody Titer   09/01/2020 - - Positive 800   07/21/2020 - - Positive 3200   05/12/2020 3.92 - Positive 6400   11/19/2019 3.85 19.0 Positive 3200   08/12/2019 3.43 23.0 Positive 6400   09/10/2018 3.90 26.6 Positive 6400   03/13/2018 6.4 66.1 Positive 6400     ASSESSMENT:  1. Fabry disease  2. On Fabrazyme IV infusion every other week  3. No Numbness of hands or feet  4. No GI issues, no headache  5. No recent biomarkers, previously elevated Lyso  6. Normal Echocardiogram on September 1, 2020  7. At risk for cardiomyopathy and myocardial infarction  8. At risk for stroke     PLAN/RECOMMENDATIONS:  1. Continue Fabrazyme 110 mg every other week  2. Recommend obtaining biomarkers plasma GL3 and LysoGL3 every 4 months and Antibody levels every 6 month.  3. Individuals with Fabry disease are at risk for stroke and MI due to their underlying disease. It is highly important to reduce the additional risk related to hypercholesterolemia.  4. Regular cardiology evaluation with Echocardiogram and EKG every 1-2 years  5. Recommend annual evaluation with Dr. Anthony Johnson (nephrology) for evaluation of renal  function  6. Pharmacotherapy Consultation for Rare Metabolic Diseases, Ambrosio Almonte PharmD  7. Return to [facility]rapies Clinic in 1 year or as needed     There will be an informational scientific meeting with international experts who are knowledgeable about your condition --- the 19th annual WORLDSymposium (February 21-25, 2023). For additional information, and for complementary (free) registration for patients and family members, go to internet URL  [email or web address] Patients and their family members should click on the REGISTRATION button. When completing the registration form, select these options: Company/Affiliation: Patient and for Title: Patient and for Registration Type: Patient and Family (Virtual)           FOLLOW-UP INSTRUCTIONS FOR THE PATIENT:  If you are returning to clinic to review specific laboratory tests, please call the Genetic Counselor (see phone numbers below)  to confirm that we have received all of the results from reference laboratories prior to your appointment. If we have not received all of the test results, please discuss re-scheduling your appointment.    With warmest regards,      Sarbjit Rod Ph.D., M.D.  Professor of Pediatrics  Medical Director, Advanced Therapies Program  Medical Director, PKU and Maternal PKU Clinic    Appointments: [phone]      Monday mornings: Advanced Therapies for Lysosomal Diseases Clinic   Monday afternoons: PKU Clinic, Metabolism Clinic, and Genetics Clinic              [facility] laboratory: [phone]/ [phone]               [facility] laboratory: [phone]  Nurse Coordinator, Metabolism and Genetics:  Claudia Gil RN, [phone]    Pharmacotherapy Consultant:  Ambrosio Almonte, PharmD, Pharmacotherapy for Metabolic Disorders (PIM[facility]): [phone]    Genetic Counselor:  Michelle Real MS, [facility] (Genetic test Results): [phone]    Metabolic Dietician:  Heather Reis, Registered Dietician:  [phone]    Advanced Therapies Clinic Scheduler:  Alise Majano, [phone]    Copies to:     Joel T Tee [facility]  [address] [facility] [address]    Dr. Yariel Carty MD  [address] [address]        Dragan Rod MD

## 2022-03-21 NOTE — PROGRESS NOTES
"Joel is a 21 year old who is being evaluated via a billable video visit.      How would you like to obtain your AVS? Mail a copy  If the video visit is dropped, the invitation should be resent by: Send to e-mail at: [email or web address]  Will anyone else be joining your video visit? Yes. Dr. Hortencia Mcnally MD. (Clinical physician), Dr.Todd Almonte, PharmD. (Pharmacist) and NADIA Farris. (Research Physician)       Niyah Galarza M.A.  Video-Visit Details    Type of service:  Video Visit    Video Start Time: 10:40 AM  Video End Time (time video stopped): 11:23 AM    Originating Location (pt. Location): Home    Distant Location (provider location):  [facility]    Mode of Communication:  Video Conference via Needle HR                [facility]  [facility] [address] [place] [address]   Phone: [phone]  Fax: [phone]  Date: 2022      Patient:  Joel Lundberg   :   2000   MRN:     [id number]      Joel Patiñoih  [address] [facility] [address]    Dear Dr. Yariel Carty and Joel Lundberg,    Thank you for sending Joel Lundberg to the [facility] Monday \"Advanced [facility]ies [facility]\" for consultation and treatment of: Fabry disease.      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 His Fabry disease is managed through Fabrazyme infusion 110 mg every other week which he started since 2018. He does not report any problems with his infusions now. Joel does not complain of any headaches, numbness of hands or feet, GI issues or any new angiokeratomas. Since the last visit, there have " "been no hospitalizations or emergency department visits. He had his [employer] Covid-19 vaccine.    Medications:  Current Outpatient Medications   Medication Sig     agalsidase beta (FABRAZYME) 5 MG Inject 90 mg into the vein every 14 days Please see Epic Therapy Plan for infusion order details.     Cholecalciferol (VITAMIN D-3 PO) Take 25,000 Int'l Units by mouth once a week     loratadine (CLARITIN) 10 MG tablet Take 1 tablet (10 mg) by mouth daily     Pseudoeph-Doxylamine-DM-APAP (NYQUIL PO) Take by mouth At Bedtime     No current facility-administered medications for this visit.       Allergies:  Allergies   Allergen Reactions     Sulfa Drugs        Physical Examination:  Height 6' 1.23\" (186 cm), weight 215 lb (97.5 kg).  Weight %tile:Facility age limit for growth percentiles is 20 years.  Height %tile: Facility age limit for growth percentiles is 20 years.  Head Circumference %tile: Facility age limit for growth percentiles is 20 years.  BMI %tile: Facility age limit for growth percentiles is 20 years.    FAMILY HISTORY: A brief family medical history was reviewed.  REVIEW OF SYSTEMS: The review of systems negative for new eye, ear, heart, lung, liver, spleen, gastrointestinal, bone, muscle, integumentary, endocrinologic, brain or psychiatric issues except as noted above.  PHYSICAL EXAMINATION:   General: The patient is oriented to person, place and time at an age-appropriate manner.   HEENT: The facial features are normal and symmetric. The ears are of normal position and configuration and hearing is grossly normal.  The tongue protrudes normally without fasciculations.  Neck: The neck  appears to be supple with full range of motion  Chest: The chest is of normal configuration. There is no tachypnea or other visible abnormalies.  Heart: The patient appears to be well perfused, and in no apparent distress.  Abdomen: Not examined.  Extremities: The extremities are of normal configuration.  Back: Not " examined,  Integument: The  visible portion of the integument is  of normal appearance without significant changes in pigmentation, birthmarks, or lesions.  Neuromuscular:  Mental Status Exam: Alert, awake. Fully oriented. No dysarthria; speech of normal fluency.  Cranial Nerves: EOMs intact, no nystagmus, facial movements symmetric.   Motor: There appears to be normal movement in all four extremities, no atrophy or fasciculations. No tremors.  Gait: By visual examination, there appears to be normal gait; normal arm swing and stance.    LABORATORY RESULTS: Laboratory studies from the past year were reviewed.    Date Plasma GL3  Reference Range:  1.34 - 4.04 mcg/mL Lyso GL3  Reference Range:  <0.30 ng/mL Anti Fabrazyme Antibody Titer   09/01/2020 - - Positive 800   07/21/2020 - - Positive 3200   05/12/2020 3.92 - Positive 6400   11/19/2019 3.85 19.0 Positive 3200   08/12/2019 3.43 23.0 Positive 6400   09/10/2018 3.90 26.6 Positive 6400   03/13/2018 6.4 66.1 Positive 6400     ASSESSMENT:  1. Fabry disease  2. On Fabrazyme IV infusion every other week  3. No Numbness of hands or feet  4. No GI issues, no headache  5. No recent biomarkers, previously elevated Lyso  6. Normal Echocardiogram on September 1, 2020  7. At risk for cardiomyopathy and myocardial infarction  8. At risk for stroke     PLAN/RECOMMENDATIONS:  1. Continue Fabrazyme 110 mg every other week  2. Recommend obtaining biomarkers plasma GL3 and LysoGL3 every 4 months and Antibody levels every 6 month.  3. Individuals with Fabry disease are at risk for stroke and MI due to their underlying disease. It is highly important to reduce the additional risk related to hypercholesterolemia.  4. Regular cardiology evaluation with Echocardiogram and EKG every 1-2 years  5. Recommend annual evaluation with Dr. Anthony Johnson (nephrology) for evaluation of renal function  6. Pharmacotherapy Consultation for Rare Metabolic Diseases, Ambrosio Almonte PharmD  7. Return to  [facility] in 1 year or as needed     There will be an informational scientific meeting with international experts who are knowledgeable about your condition --- the 19th annual WORLDSymposium (February 21-25, 2023). For additional information, and for complementary (free) registration for patients and family members, go to internet URL  [email or web address] Patients and their family members should click on the REGISTRATION button. When completing the registration form, select these options: Company/Affiliation: Patient and for Title: Patient and for Registration Type: Patient and Family (Virtual)           FOLLOW-UP INSTRUCTIONS FOR THE PATIENT:  If you are returning to clinic to review specific laboratory tests, please call the Genetic Counselor (see phone numbers below)  to confirm that we have received all of the results from reference laboratories prior to your appointment. If we have not received all of the test results, please discuss re-scheduling your appointment.    With warmest regards,      Sarbjit Rod Ph.D., M.D.  Professor of Pediatrics  Medical Director, Advanced Therapies Program  Medical Director, PKU and Maternal PKU Clinic    Appointments: [phone]      Monday mornings: [facility]ies for Lysosomal Diseases Clinic   Monday afternoons: PKU Clinic, Metabolism Clinic, and Genetics Clinic              [facility] laboratory: [phone]/ [phone]               [facility] laboratory: [phone]  Nurse Coordinator, Metabolism and Genetics:  Claudia Gil RN, [phone]    Pharmacotherapy Consultant:  Ambrosio Almonte, PharmD, Pharmacotherapy for Metabolic Disorders (PIMD): [phone]    Genetic Counselor:  Michelle Real MS, [facility] (Genetic test Results): [phone]    Metabolic Dietician:  Heather Reis, Registered Dietician: [phone]    [facility] Scheduler:  Alise Majano, [phone]    Copies to:     Joel RODRIGUEZ  [facility]  [address] [facility] [address]    Dr. Yariel Carty MD  [address] [address]

## 2022-03-25 ENCOUNTER — HOME INFUSION (PRE-WILLOW HOME INFUSION) (OUTPATIENT)
Dept: PHARMACY | Facility: CLINIC | Age: 22
End: 2022-03-25

## 2022-03-29 ENCOUNTER — HOME INFUSION (PRE-WILLOW HOME INFUSION) (OUTPATIENT)
Dept: PHARMACY | Facility: CLINIC | Age: 22
End: 2022-03-29

## 2022-03-29 ENCOUNTER — LAB REQUISITION (OUTPATIENT)
Dept: LAB | Facility: CLINIC | Age: 22
End: 2022-03-29
Payer: COMMERCIAL

## 2022-03-29 DIAGNOSIS — E75.21 FABRY (-ANDERSON) DISEASE (H): ICD-10-CM

## 2022-03-29 LAB
ALBUMIN SERPL-MCNC: 3.9 G/DL (ref 3.4–5)
ALBUMIN UR-MCNC: NEGATIVE MG/DL
ALP SERPL-CCNC: 66 U/L (ref 40–150)
ALT SERPL W P-5'-P-CCNC: 23 U/L (ref 0–70)
ANION GAP SERPL CALCULATED.3IONS-SCNC: 8 MMOL/L (ref 3–14)
APPEARANCE UR: CLEAR
AST SERPL W P-5'-P-CCNC: 11 U/L (ref 0–45)
BASOPHILS # BLD AUTO: 0 10E3/UL (ref 0–0.2)
BASOPHILS NFR BLD AUTO: 0 %
BILIRUB SERPL-MCNC: 0.4 MG/DL (ref 0.2–1.3)
BILIRUB UR QL STRIP: NEGATIVE
BUN SERPL-MCNC: 14 MG/DL (ref 7–30)
CALCIUM SERPL-MCNC: 8.9 MG/DL (ref 8.5–10.1)
CHLORIDE BLD-SCNC: 112 MMOL/L (ref 94–109)
CO2 SERPL-SCNC: 26 MMOL/L (ref 20–32)
COLOR UR AUTO: NORMAL
CREAT SERPL-MCNC: 0.85 MG/DL (ref 0.66–1.25)
CREAT UR-MCNC: 90 MG/DL
CREAT UR-MCNC: 90 MG/DL
EOSINOPHIL # BLD AUTO: 0.2 10E3/UL (ref 0–0.7)
EOSINOPHIL NFR BLD AUTO: 2 %
ERYTHROCYTE [DISTWIDTH] IN BLOOD BY AUTOMATED COUNT: 12.9 % (ref 10–15)
GFR SERPL CREATININE-BSD FRML MDRD: >90 ML/MIN/1.73M2
GLUCOSE BLD-MCNC: 69 MG/DL (ref 70–99)
GLUCOSE UR STRIP-MCNC: NEGATIVE MG/DL
HCT VFR BLD AUTO: 44 % (ref 40–53)
HGB BLD-MCNC: 14.7 G/DL (ref 13.3–17.7)
HGB UR QL STRIP: NEGATIVE
IMM GRANULOCYTES # BLD: 0 10E3/UL
IMM GRANULOCYTES NFR BLD: 0 %
KETONES UR STRIP-MCNC: NEGATIVE MG/DL
LEUKOCYTE ESTERASE UR QL STRIP: NEGATIVE
LYMPHOCYTES # BLD AUTO: 2.3 10E3/UL (ref 0.8–5.3)
LYMPHOCYTES NFR BLD AUTO: 34 %
MCH RBC QN AUTO: 30.7 PG (ref 26.5–33)
MCHC RBC AUTO-ENTMCNC: 33.4 G/DL (ref 31.5–36.5)
MCV RBC AUTO: 92 FL (ref 78–100)
MICROALBUMIN UR-MCNC: 6 MG/L
MICROALBUMIN/CREAT UR: 6.67 MG/G CR (ref 0–17)
MONOCYTES # BLD AUTO: 0.7 10E3/UL (ref 0–1.3)
MONOCYTES NFR BLD AUTO: 10 %
NEUTROPHILS # BLD AUTO: 3.5 10E3/UL (ref 1.6–8.3)
NEUTROPHILS NFR BLD AUTO: 54 %
NITRATE UR QL: NEGATIVE
NRBC # BLD AUTO: 0 10E3/UL
NRBC BLD AUTO-RTO: 0 /100
PH UR STRIP: 6.5 [PH] (ref 5–7)
PLATELET # BLD AUTO: 286 10E3/UL (ref 150–450)
POTASSIUM BLD-SCNC: 3.6 MMOL/L (ref 3.4–5.3)
PROT SERPL-MCNC: 7.2 G/DL (ref 6.8–8.8)
PROT UR-MCNC: 0.3 G/L
PROT/CREAT 24H UR: 0.33 G/G CR (ref 0–0.2)
RBC # BLD AUTO: 4.79 10E6/UL (ref 4.4–5.9)
SODIUM SERPL-SCNC: 146 MMOL/L (ref 133–144)
SP GR UR STRIP: 1.02 (ref 1–1.03)
UROBILINOGEN UR STRIP-MCNC: NORMAL MG/DL
WBC # BLD AUTO: 6.6 10E3/UL (ref 4–11)

## 2022-03-29 PROCEDURE — 81003 URINALYSIS AUTO W/O SCOPE: CPT | Performed by: PEDIATRICS

## 2022-03-29 PROCEDURE — 82043 UR ALBUMIN QUANTITATIVE: CPT | Performed by: PEDIATRICS

## 2022-03-29 PROCEDURE — 85025 COMPLETE CBC W/AUTO DIFF WBC: CPT | Performed by: PEDIATRICS

## 2022-03-29 PROCEDURE — 80053 COMPREHEN METABOLIC PANEL: CPT | Performed by: PEDIATRICS

## 2022-03-29 PROCEDURE — 84156 ASSAY OF PROTEIN URINE: CPT | Performed by: PEDIATRICS

## 2022-04-08 ENCOUNTER — HOME INFUSION (PRE-WILLOW HOME INFUSION) (OUTPATIENT)
Dept: PHARMACY | Facility: CLINIC | Age: 22
End: 2022-04-08

## 2022-04-11 NOTE — PROGRESS NOTES
This is a recent snapshot of the patient's [facility] medical record.  For current drug dose and complete information and questions, call [phone] or In [facility] pool, fv home infusion ([id number])  CSN Number:  [id number]

## 2022-04-12 ENCOUNTER — HOME INFUSION (PRE-WILLOW HOME INFUSION) (OUTPATIENT)
Dept: PHARMACY | Facility: CLINIC | Age: 22
End: 2022-04-12
Payer: COMMERCIAL

## 2022-04-12 ENCOUNTER — DOCUMENTATION ONLY (OUTPATIENT)
Dept: PHARMACY | Facility: CLINIC | Age: 22
End: 2022-04-12
Payer: COMMERCIAL

## 2022-04-12 LAB
SCANNED LAB RESULT: NORMAL
SCANNED LAB RESULT: NORMAL

## 2022-04-12 NOTE — PROGRESS NOTES
Skilled Nurse visit in the  patient home/I Infusion Suite to administer Fabrazyme .  No recent elevated temperature, fever, chills, productive cough, coughing for 3 weeks or longer or hemoptysis, abnormal vital signs, night sweats, chest pain. No  decrease in your appetite, unexplained weight loss or fatigue.  No other new onset medical symptoms.  Current weight NA.  PIV placed right AC, 2 attempt/s.  Pre medicated with Tylenol and Methylprednisolone. Labs drawn NA. Infusion completed with/without complication or reaction. Pt reports therapy is effective in managing symptoms related to therapy.     Kayla Jones RN, BSN, PHN  [facility]  Work Cell [phone]  Work Email [email or web address]

## 2022-04-13 NOTE — PROGRESS NOTES
This is a recent snapshot of the patient's [facility] medical record.  For current drug dose and complete information and questions, call [phone] or In Basket pool, fv home infusion ([id number])  CSN Number:  [id number]

## 2022-04-15 NOTE — PROGRESS NOTES
This is a recent snapshot of the patient's [facility] medical record.  For current drug dose and complete information and questions, call [phone] or In Basket pool, fv home infusion ([id number])  CSN Number:  [id number]

## 2022-04-20 NOTE — PROGRESS NOTES
This is a recent snapshot of the patient's [facility] medical record.  For current drug dose and complete information and questions, call [phone] or In [facility] pool, fv home infusion ([id number])  CSN Number:  [id number]

## 2022-04-22 ENCOUNTER — HOME INFUSION (PRE-WILLOW HOME INFUSION) (OUTPATIENT)
Dept: PHARMACY | Facility: CLINIC | Age: 22
End: 2022-04-22
Payer: COMMERCIAL

## 2022-04-25 NOTE — PROGRESS NOTES
This is a recent snapshot of the patient's [facility] medical record.  For current drug dose and complete information and questions, call [phone] or In Basket pool, fv home infusion ([id number])  CSN Number:  [id number]

## 2022-04-26 ENCOUNTER — HOME INFUSION (PRE-WILLOW HOME INFUSION) (OUTPATIENT)
Dept: PHARMACY | Facility: CLINIC | Age: 22
End: 2022-04-26
Payer: COMMERCIAL

## 2022-04-26 NOTE — PROGRESS NOTES
This is a recent snapshot of the patient's [facility] medical record.  For current drug dose and complete information and questions, call [phone] or In [facility] pool, fv home infusion ([id number])  CSN Number:  [id number]

## 2022-04-27 NOTE — PROGRESS NOTES
This is a recent snapshot of the patient's [facility] medical record.  For current drug dose and complete information and questions, call [phone] or In [facility] pool, fv home infusion ([id number])  CSN Number:  [id number]

## 2022-04-28 NOTE — PROGRESS NOTES
This is a recent snapshot of the patient's [facility] medical record.  For current drug dose and complete information and questions, call [phone] or In Basket pool, fv home infusion ([id number])  CSN Number:  [id number]

## 2022-04-29 NOTE — PROGRESS NOTES
This is a recent snapshot of the patient's [facility] medical record.  For current drug dose and complete information and questions, call [phone] or In Basket pool, fv home infusion ([id number])  CSN Number:  [id number]

## 2022-05-09 NOTE — PROGRESS NOTES
This is a recent snapshot of the patient's [facility] medical record.  For current drug dose and complete information and questions, call [phone] or In Basket pool, fv home infusion ([id number])  CSN Number:  [id number]

## 2022-05-10 ENCOUNTER — HOME INFUSION (PRE-WILLOW HOME INFUSION) (OUTPATIENT)
Dept: PHARMACY | Facility: CLINIC | Age: 22
End: 2022-05-10

## 2022-05-20 ENCOUNTER — HOME INFUSION (PRE-WILLOW HOME INFUSION) (OUTPATIENT)
Dept: PHARMACY | Facility: CLINIC | Age: 22
End: 2022-05-20

## 2022-05-24 ENCOUNTER — HOME INFUSION (PRE-WILLOW HOME INFUSION) (OUTPATIENT)
Dept: PHARMACY | Facility: CLINIC | Age: 22
End: 2022-05-24
Payer: COMMERCIAL

## 2022-05-25 NOTE — PROGRESS NOTES
This is a recent snapshot of the patient's [facility] medical record.  For current drug dose and complete information and questions, call [phone] or In Basket pool, fv home infusion ([id number])  CSN Number:  [id number]

## 2022-06-03 ENCOUNTER — HOME INFUSION (PRE-WILLOW HOME INFUSION) (OUTPATIENT)
Dept: PHARMACY | Facility: CLINIC | Age: 22
End: 2022-06-03
Payer: COMMERCIAL

## 2022-06-06 ENCOUNTER — HOME INFUSION (PRE-WILLOW HOME INFUSION) (OUTPATIENT)
Dept: PHARMACY | Facility: CLINIC | Age: 22
End: 2022-06-06
Payer: COMMERCIAL

## 2022-06-06 NOTE — PROGRESS NOTES
This is a recent snapshot of the patient's [facility] medical record.  For current drug dose and complete information and questions, call [phone] or In Basket pool, fv home infusion ([id number])  CSN Number:  [id number]

## 2022-06-07 ENCOUNTER — HOME INFUSION (PRE-WILLOW HOME INFUSION) (OUTPATIENT)
Dept: PHARMACY | Facility: CLINIC | Age: 22
End: 2022-06-07
Payer: COMMERCIAL

## 2022-06-08 NOTE — PROGRESS NOTES
This is a recent snapshot of the patient's [facility] medical record.  For current drug dose and complete information and questions, call [phone] or In [facility] pool, fv home infusion ([id number])  CSN Number:  [id number]

## 2022-06-17 ENCOUNTER — HOME INFUSION (PRE-WILLOW HOME INFUSION) (OUTPATIENT)
Dept: PHARMACY | Facility: CLINIC | Age: 22
End: 2022-06-17

## 2022-06-17 NOTE — PROGRESS NOTES
This is a recent snapshot of the patient's [facility] medical record.  For current drug dose and complete information and questions, call [phone] or In Basket pool, fv home infusion ([id number])  CSN Number:  [id number]

## 2022-06-20 NOTE — PROGRESS NOTES
This is a recent snapshot of the patient's [facility] medical record.  For current drug dose and complete information and questions, call [phone] or In Basket pool, fv home infusion ([id number])  CSN Number:  [id number]

## 2022-06-21 ENCOUNTER — LAB REQUISITION (OUTPATIENT)
Dept: LAB | Facility: CLINIC | Age: 22
End: 2022-06-21
Payer: COMMERCIAL

## 2022-06-21 ENCOUNTER — HOME INFUSION (PRE-WILLOW HOME INFUSION) (OUTPATIENT)
Dept: PHARMACY | Facility: CLINIC | Age: 22
End: 2022-06-21

## 2022-06-21 LAB
ALBUMIN SERPL-MCNC: 3.8 G/DL (ref 3.4–5)
ALP SERPL-CCNC: 61 U/L (ref 40–150)
ALT SERPL W P-5'-P-CCNC: 22 U/L (ref 0–70)
ANION GAP SERPL CALCULATED.3IONS-SCNC: 7 MMOL/L (ref 3–14)
AST SERPL W P-5'-P-CCNC: 19 U/L (ref 0–45)
BASOPHILS # BLD AUTO: 0 10E3/UL (ref 0–0.2)
BASOPHILS NFR BLD AUTO: 1 %
BILIRUB SERPL-MCNC: 0.4 MG/DL (ref 0.2–1.3)
BUN SERPL-MCNC: 19 MG/DL (ref 7–30)
CALCIUM SERPL-MCNC: 8.5 MG/DL (ref 8.5–10.1)
CHLORIDE BLD-SCNC: 110 MMOL/L (ref 94–109)
CO2 SERPL-SCNC: 23 MMOL/L (ref 20–32)
CREAT SERPL-MCNC: 0.56 MG/DL (ref 0.66–1.25)
CREAT UR-MCNC: 82 MG/DL
CREAT UR-MCNC: 82 MG/DL
EOSINOPHIL # BLD AUTO: 0.2 10E3/UL (ref 0–0.7)
EOSINOPHIL NFR BLD AUTO: 3 %
ERYTHROCYTE [DISTWIDTH] IN BLOOD BY AUTOMATED COUNT: 12.6 % (ref 10–15)
GFR SERPL CREATININE-BSD FRML MDRD: >90 ML/MIN/1.73M2
GLUCOSE BLD-MCNC: 82 MG/DL (ref 70–99)
HCT VFR BLD AUTO: 46.1 % (ref 40–53)
HGB BLD-MCNC: 15.5 G/DL (ref 13.3–17.7)
HOLD SPECIMEN: NORMAL
IMM GRANULOCYTES # BLD: 0 10E3/UL
IMM GRANULOCYTES NFR BLD: 1 %
LYMPHOCYTES # BLD AUTO: 1.7 10E3/UL (ref 0.8–5.3)
LYMPHOCYTES NFR BLD AUTO: 27 %
MCH RBC QN AUTO: 30.5 PG (ref 26.5–33)
MCHC RBC AUTO-ENTMCNC: 33.6 G/DL (ref 31.5–36.5)
MCV RBC AUTO: 91 FL (ref 78–100)
MICROALBUMIN UR-MCNC: 7 MG/L
MICROALBUMIN/CREAT UR: 8.54 MG/G CR (ref 0–17)
MONOCYTES # BLD AUTO: 0.5 10E3/UL (ref 0–1.3)
MONOCYTES NFR BLD AUTO: 8 %
NEUTROPHILS # BLD AUTO: 3.8 10E3/UL (ref 1.6–8.3)
NEUTROPHILS NFR BLD AUTO: 60 %
NRBC # BLD AUTO: 0 10E3/UL
NRBC BLD AUTO-RTO: 0 /100
PLATELET # BLD AUTO: 288 10E3/UL (ref 150–450)
POTASSIUM BLD-SCNC: 5.4 MMOL/L (ref 3.4–5.3)
PROT SERPL-MCNC: 7.1 G/DL (ref 6.8–8.8)
PROT UR-MCNC: 0.13 G/L
PROT/CREAT 24H UR: 0.16 G/G CR (ref 0–0.2)
RBC # BLD AUTO: 5.08 10E6/UL (ref 4.4–5.9)
SODIUM SERPL-SCNC: 140 MMOL/L (ref 133–144)
WBC # BLD AUTO: 6.2 10E3/UL (ref 4–11)

## 2022-06-21 PROCEDURE — 82043 UR ALBUMIN QUANTITATIVE: CPT | Performed by: PEDIATRICS

## 2022-06-21 PROCEDURE — 85025 COMPLETE CBC W/AUTO DIFF WBC: CPT | Performed by: PEDIATRICS

## 2022-06-21 PROCEDURE — 80053 COMPREHEN METABOLIC PANEL: CPT | Performed by: PEDIATRICS

## 2022-06-21 PROCEDURE — 84156 ASSAY OF PROTEIN URINE: CPT | Performed by: PEDIATRICS

## 2022-06-21 NOTE — PROGRESS NOTES
This is a recent snapshot of the patient's [facility] medical record.  For current drug dose and complete information and questions, call [phone] or In Basket pool, fv home infusion ([id number])  CSN Number:  [id number]

## 2022-06-22 NOTE — PROGRESS NOTES
This is a recent snapshot of the patient's [facility] medical record.  For current drug dose and complete information and questions, call [phone] or In Basket pool, fv home infusion ([id number])  CSN Number:  [id number]

## 2022-06-28 ENCOUNTER — HOME INFUSION (PRE-WILLOW HOME INFUSION) (OUTPATIENT)
Dept: PHARMACY | Facility: CLINIC | Age: 22
End: 2022-06-28

## 2022-06-28 LAB — SCANNED LAB RESULT: ABNORMAL

## 2022-06-30 LAB — SCANNED LAB RESULT: NORMAL

## 2022-06-30 NOTE — PROGRESS NOTES
This is a recent snapshot of the patient's [facility] medical record.  For current drug dose and complete information and questions, call [phone] or In Basket pool, fv home infusion ([id number])  CSN Number:  [id number]

## 2022-07-01 ENCOUNTER — HOME INFUSION (PRE-WILLOW HOME INFUSION) (OUTPATIENT)
Dept: PHARMACY | Facility: CLINIC | Age: 22
End: 2022-07-01

## 2022-07-01 NOTE — PROGRESS NOTES
This is a recent snapshot of the patient's [facility] medical record.  For current drug dose and complete information and questions, call [phone] or In Basket pool, fv home infusion ([id number])  CSN Number:  [id number]

## 2022-07-06 NOTE — PROGRESS NOTES
This is a recent snapshot of the patient's [facility] medical record.  For current drug dose and complete information and questions, call [phone] or In Basket pool, fv home infusion ([id number])  CSN Number:  [id number]

## 2022-07-14 NOTE — PROGRESS NOTES
This is a recent snapshot of the patient's [facility] medical record.  For current drug dose and complete information and questions, call [phone] or In Basket pool, fv home infusion ([id number])  CSN Number:  [id number]

## 2022-07-15 ENCOUNTER — HOME INFUSION (PRE-WILLOW HOME INFUSION) (OUTPATIENT)
Dept: PHARMACY | Facility: CLINIC | Age: 22
End: 2022-07-15

## 2022-07-19 ENCOUNTER — HOME INFUSION (PRE-WILLOW HOME INFUSION) (OUTPATIENT)
Dept: PHARMACY | Facility: CLINIC | Age: 22
End: 2022-07-19

## 2022-07-22 NOTE — PROGRESS NOTES
This is a recent snapshot of the patient's [facility] medical record.  For current drug dose and complete information and questions, call [phone] or In Basket pool, fv home infusion ([id number])  CSN Number:  [id number]

## 2022-07-28 NOTE — PROGRESS NOTES
This is a recent snapshot of the patient's [facility] medical record.  For current drug dose and complete information and questions, call [phone] or In Basket pool, fv home infusion ([id number])  CSN Number:  [id number]

## 2022-07-29 ENCOUNTER — HOME INFUSION (PRE-WILLOW HOME INFUSION) (OUTPATIENT)
Dept: PHARMACY | Facility: CLINIC | Age: 22
End: 2022-07-29

## 2022-08-01 NOTE — PROGRESS NOTES
This is a recent snapshot of the patient's [facility] medical record.  For current drug dose and complete information and questions, call [phone] or In Basket pool, fv home infusion ([id number])  CSN Number:  [id number]

## 2022-08-02 ENCOUNTER — HOME INFUSION (PRE-WILLOW HOME INFUSION) (OUTPATIENT)
Dept: PHARMACY | Facility: CLINIC | Age: 22
End: 2022-08-02

## 2022-08-02 NOTE — PROGRESS NOTES
This is a recent snapshot of the patient's [facility] medical record.  For current drug dose and complete information and questions, call [phone] or In Basket pool, fv home infusion ([id number])  CSN Number:  [id number]

## 2022-08-03 NOTE — PROGRESS NOTES
This is a recent snapshot of the patient's [facility] medical record.  For current drug dose and complete information and questions, call [phone] or In Basket pool, fv home infusion ([id number])  CSN Number:  [id number]

## 2022-08-12 ENCOUNTER — HOME INFUSION (PRE-WILLOW HOME INFUSION) (OUTPATIENT)
Dept: PHARMACY | Facility: CLINIC | Age: 22
End: 2022-08-12

## 2022-08-16 ENCOUNTER — DOCUMENTATION ONLY (OUTPATIENT)
Dept: PHARMACY | Facility: CLINIC | Age: 22
End: 2022-08-16

## 2022-08-16 ENCOUNTER — HOME INFUSION (PRE-WILLOW HOME INFUSION) (OUTPATIENT)
Dept: PHARMACY | Facility: CLINIC | Age: 22
End: 2022-08-16

## 2022-08-16 NOTE — PROGRESS NOTES
Skilled Nurse visit in the home to administer fabryzyme.  No recent elevated temperature, fever, chills, productive cough, coughing for 3 weeks or longer or hemoptysis, abnormal vital signs, night sweats, chest pain. No  decrease in your appetite, unexplained weight loss or fatigue.  No other new onset medical symptoms.  .  PIV started right AC with 1 attempt. Pre medicated with Tylenol and methylprednisone. Labs drawn NS. Infusion completed without complication or reaction. Pt reports therapy is effective in managing symptoms related to therapy.    Kayla Jones RN, BSN, PHN  [facility]  Work Cell [phone]  Work Email [email or web address]

## 2022-08-26 ENCOUNTER — HOME INFUSION (PRE-WILLOW HOME INFUSION) (OUTPATIENT)
Dept: PHARMACY | Facility: CLINIC | Age: 22
End: 2022-08-26

## 2022-08-30 ENCOUNTER — HOME INFUSION (PRE-WILLOW HOME INFUSION) (OUTPATIENT)
Dept: PHARMACY | Facility: CLINIC | Age: 22
End: 2022-08-30

## 2022-08-31 NOTE — PROGRESS NOTES
This is a recent snapshot of the patient's [facility] medical record.  For current drug dose and complete information and questions, call [phone] or In Basket pool, fv home infusion ([id number])  CSN Number:  [id number]

## 2022-09-09 ENCOUNTER — HOME INFUSION (PRE-WILLOW HOME INFUSION) (OUTPATIENT)
Dept: PHARMACY | Facility: CLINIC | Age: 22
End: 2022-09-09

## 2022-09-13 ENCOUNTER — HOME INFUSION (PRE-WILLOW HOME INFUSION) (OUTPATIENT)
Dept: PHARMACY | Facility: CLINIC | Age: 22
End: 2022-09-13

## 2022-09-13 ENCOUNTER — DOCUMENTATION ONLY (OUTPATIENT)
Dept: PHARMACY | Facility: CLINIC | Age: 22
End: 2022-09-13

## 2022-09-13 NOTE — PROGRESS NOTES
Skilled Nurse visit in the home to administer frabrzyme.  No recent elevated temperature, fever, chills, productive cough, coughing for 3 weeks or longer or hemoptysis, abnormal vital signs, night sweats, chest pain. No  decrease in your appetite, unexplained weight loss or fatigue.  No other new onset medical symptoms. .  PIV to right ac with 1 attempt.  Pre medicated with Tylenol and solumedrol. Labs drawn NA. Infusion completed without complication or reaction. Pt reports therapy is effective in managing symptoms related to therapy.

## 2022-09-14 NOTE — PROGRESS NOTES
This is a recent snapshot of the patient's [facility] medical record.  For current drug dose and complete information and questions, call [phone] or In Basket pool, fv home infusion ([id number])  CSN Number:  [id number]

## 2022-09-14 NOTE — PROGRESS NOTES
This is a recent snapshot of the patient's [facility] medical record.  For current drug dose and complete information and questions, call [phone] or In Basket pool, fv home infusion ([id number])  CSN Number:  [id number]

## 2022-09-23 ENCOUNTER — HOME INFUSION (PRE-WILLOW HOME INFUSION) (OUTPATIENT)
Dept: PHARMACY | Facility: CLINIC | Age: 22
End: 2022-09-23

## 2022-09-27 ENCOUNTER — LAB REQUISITION (OUTPATIENT)
Dept: LAB | Facility: CLINIC | Age: 22
End: 2022-09-27
Payer: COMMERCIAL

## 2022-09-27 ENCOUNTER — HOME INFUSION (PRE-WILLOW HOME INFUSION) (OUTPATIENT)
Dept: PHARMACY | Facility: CLINIC | Age: 22
End: 2022-09-27

## 2022-09-27 DIAGNOSIS — E75.21 FABRY (-ANDERSON) DISEASE (H): ICD-10-CM

## 2022-09-27 LAB
ALBUMIN SERPL-MCNC: 3.7 G/DL (ref 3.4–5)
ALP SERPL-CCNC: 52 U/L (ref 40–150)
ALT SERPL W P-5'-P-CCNC: 19 U/L (ref 0–70)
ANION GAP SERPL CALCULATED.3IONS-SCNC: 6 MMOL/L (ref 3–14)
AST SERPL W P-5'-P-CCNC: 20 U/L (ref 0–45)
BASOPHILS # BLD AUTO: 0.1 10E3/UL (ref 0–0.2)
BASOPHILS NFR BLD AUTO: 1 %
BILIRUB SERPL-MCNC: 0.7 MG/DL (ref 0.2–1.3)
BUN SERPL-MCNC: 10 MG/DL (ref 7–30)
CALCIUM SERPL-MCNC: 8.5 MG/DL (ref 8.5–10.1)
CHLORIDE BLD-SCNC: 110 MMOL/L (ref 94–109)
CO2 SERPL-SCNC: 26 MMOL/L (ref 20–32)
CREAT SERPL-MCNC: 0.62 MG/DL (ref 0.66–1.25)
CREAT UR-MCNC: 104 MG/DL
CREAT UR-MCNC: 104 MG/DL
EOSINOPHIL # BLD AUTO: 0.6 10E3/UL (ref 0–0.7)
EOSINOPHIL NFR BLD AUTO: 11 %
ERYTHROCYTE [DISTWIDTH] IN BLOOD BY AUTOMATED COUNT: 13.6 % (ref 10–15)
GFR SERPL CREATININE-BSD FRML MDRD: >90 ML/MIN/1.73M2
GLUCOSE BLD-MCNC: 80 MG/DL (ref 70–99)
HCT VFR BLD AUTO: 45.6 % (ref 40–53)
HGB BLD-MCNC: 15.3 G/DL (ref 13.3–17.7)
HOLD SPECIMEN: NORMAL
HOLD SPECIMEN: NORMAL
IMM GRANULOCYTES # BLD: 0 10E3/UL
IMM GRANULOCYTES NFR BLD: 0 %
LYMPHOCYTES # BLD AUTO: 1.6 10E3/UL (ref 0.8–5.3)
LYMPHOCYTES NFR BLD AUTO: 27 %
MCH RBC QN AUTO: 30.7 PG (ref 26.5–33)
MCHC RBC AUTO-ENTMCNC: 33.6 G/DL (ref 31.5–36.5)
MCV RBC AUTO: 92 FL (ref 78–100)
MICROALBUMIN UR-MCNC: 8 MG/L
MICROALBUMIN/CREAT UR: 7.69 MG/G CR (ref 0–17)
MONOCYTES # BLD AUTO: 0.4 10E3/UL (ref 0–1.3)
MONOCYTES NFR BLD AUTO: 8 %
NEUTROPHILS # BLD AUTO: 3.1 10E3/UL (ref 1.6–8.3)
NEUTROPHILS NFR BLD AUTO: 53 %
NRBC # BLD AUTO: 0 10E3/UL
NRBC BLD AUTO-RTO: 0 /100
PLATELET # BLD AUTO: 276 10E3/UL (ref 150–450)
POTASSIUM BLD-SCNC: 3.7 MMOL/L (ref 3.4–5.3)
PROT SERPL-MCNC: 6.7 G/DL (ref 6.8–8.8)
PROT UR-MCNC: 0.19 G/L
PROT/CREAT 24H UR: 0.18 G/G CR (ref 0–0.2)
RBC # BLD AUTO: 4.98 10E6/UL (ref 4.4–5.9)
SODIUM SERPL-SCNC: 142 MMOL/L (ref 133–144)
WBC # BLD AUTO: 5.8 10E3/UL (ref 4–11)

## 2022-09-27 PROCEDURE — 84156 ASSAY OF PROTEIN URINE: CPT | Performed by: PEDIATRICS

## 2022-09-27 PROCEDURE — 82043 UR ALBUMIN QUANTITATIVE: CPT | Performed by: PEDIATRICS

## 2022-09-27 PROCEDURE — 80053 COMPREHEN METABOLIC PANEL: CPT | Performed by: PEDIATRICS

## 2022-09-27 PROCEDURE — 85025 COMPLETE CBC W/AUTO DIFF WBC: CPT | Performed by: PEDIATRICS

## 2022-09-27 PROCEDURE — 82040 ASSAY OF SERUM ALBUMIN: CPT | Performed by: PEDIATRICS

## 2022-09-27 NOTE — PROGRESS NOTES
This is a recent snapshot of the patient's [facility] medical record.  For current drug dose and complete information and questions, call [phone] or In Basket pool, fv home infusion ([id number])  CSN Number:  [id number]

## 2022-09-28 NOTE — PROGRESS NOTES
This is a recent snapshot of the patient's [facility] medical record.  For current drug dose and complete information and questions, call [phone] or In Basket pool, fv home infusion ([id number])  CSN Number:  [id number]

## 2022-10-03 ENCOUNTER — HOME INFUSION (PRE-WILLOW HOME INFUSION) (OUTPATIENT)
Dept: PHARMACY | Facility: CLINIC | Age: 22
End: 2022-10-03

## 2022-10-04 NOTE — PROGRESS NOTES
This is a recent snapshot of the patient's [facility] medical record.  For current drug dose and complete information and questions, call [phone] or In Basket pool, fv home infusion ([id number])  CSN Number:  [id number]

## 2022-10-07 ENCOUNTER — HOME INFUSION (PRE-WILLOW HOME INFUSION) (OUTPATIENT)
Dept: PHARMACY | Facility: CLINIC | Age: 22
End: 2022-10-07

## 2022-10-11 ENCOUNTER — LAB REQUISITION (OUTPATIENT)
Dept: LAB | Facility: CLINIC | Age: 22
End: 2022-10-11
Payer: COMMERCIAL

## 2022-10-11 DIAGNOSIS — E75.21 FABRY (-ANDERSON) DISEASE (H): ICD-10-CM

## 2022-10-11 LAB
ALBUMIN SERPL BCG-MCNC: 4.2 G/DL (ref 3.5–5.2)
ALP SERPL-CCNC: 56 U/L (ref 40–129)
ALT SERPL W P-5'-P-CCNC: 10 U/L (ref 10–50)
ANION GAP SERPL CALCULATED.3IONS-SCNC: 9 MMOL/L (ref 7–15)
AST SERPL W P-5'-P-CCNC: 20 U/L (ref 10–50)
BASOPHILS # BLD AUTO: 0 10E3/UL (ref 0–0.2)
BASOPHILS NFR BLD AUTO: 1 %
BILIRUB SERPL-MCNC: 0.7 MG/DL
BUN SERPL-MCNC: 12.8 MG/DL (ref 6–20)
CALCIUM SERPL-MCNC: 9 MG/DL (ref 8.6–10)
CHLORIDE SERPL-SCNC: 106 MMOL/L (ref 98–107)
CREAT SERPL-MCNC: 0.82 MG/DL (ref 0.67–1.17)
DEPRECATED HCO3 PLAS-SCNC: 26 MMOL/L (ref 22–29)
EOSINOPHIL # BLD AUTO: 0.3 10E3/UL (ref 0–0.7)
EOSINOPHIL NFR BLD AUTO: 5 %
ERYTHROCYTE [DISTWIDTH] IN BLOOD BY AUTOMATED COUNT: 13.2 % (ref 10–15)
GFR SERPL CREATININE-BSD FRML MDRD: >90 ML/MIN/1.73M2
GLUCOSE SERPL-MCNC: 75 MG/DL (ref 70–99)
HCT VFR BLD AUTO: 47.3 % (ref 40–53)
HGB BLD-MCNC: 15.1 G/DL (ref 13.3–17.7)
IMM GRANULOCYTES # BLD: 0 10E3/UL
IMM GRANULOCYTES NFR BLD: 0 %
LYMPHOCYTES # BLD AUTO: 1.9 10E3/UL (ref 0.8–5.3)
LYMPHOCYTES NFR BLD AUTO: 29 %
MCH RBC QN AUTO: 30.2 PG (ref 26.5–33)
MCHC RBC AUTO-ENTMCNC: 31.9 G/DL (ref 31.5–36.5)
MCV RBC AUTO: 95 FL (ref 78–100)
MONOCYTES # BLD AUTO: 0.5 10E3/UL (ref 0–1.3)
MONOCYTES NFR BLD AUTO: 8 %
NEUTROPHILS # BLD AUTO: 3.8 10E3/UL (ref 1.6–8.3)
NEUTROPHILS NFR BLD AUTO: 57 %
NRBC # BLD AUTO: 0 10E3/UL
NRBC BLD AUTO-RTO: 0 /100
PLATELET # BLD AUTO: 293 10E3/UL (ref 150–450)
POTASSIUM SERPL-SCNC: 4.2 MMOL/L (ref 3.4–5.3)
PROT SERPL-MCNC: 6.4 G/DL (ref 6.4–8.3)
RBC # BLD AUTO: 5 10E6/UL (ref 4.4–5.9)
SCANNED LAB RESULT: NORMAL
SODIUM SERPL-SCNC: 141 MMOL/L (ref 136–145)
WBC # BLD AUTO: 6.7 10E3/UL (ref 4–11)

## 2022-10-11 PROCEDURE — 80053 COMPREHEN METABOLIC PANEL: CPT | Performed by: PEDIATRICS

## 2022-10-11 PROCEDURE — 85025 COMPLETE CBC W/AUTO DIFF WBC: CPT | Performed by: PEDIATRICS

## 2022-10-11 NOTE — PROGRESS NOTES
This is a recent snapshot of the patient's [facility] medical record.  For current drug dose and complete information and questions, call [phone] or In Basket pool, fv home infusion ([id number])  CSN Number:  [id number]

## 2022-10-18 NOTE — PROGRESS NOTES
This is a recent snapshot of the patient's [facility] medical record.  For current drug dose and complete information and questions, call [phone] or In Basket pool, fv home infusion ([id number])  CSN Number:  [id number]

## 2022-10-20 ENCOUNTER — HOME INFUSION (PRE-WILLOW HOME INFUSION) (OUTPATIENT)
Dept: PHARMACY | Facility: CLINIC | Age: 22
End: 2022-10-20

## 2022-10-20 LAB — SCANNED LAB RESULT: NORMAL

## 2022-10-20 RX ORDER — METHYLPREDNISOLONE SODIUM SUCCINATE 40 MG/ML
80 INJECTION, POWDER, LYOPHILIZED, FOR SOLUTION INTRAMUSCULAR; INTRAVENOUS ONCE
Status: CANCELLED
Start: 2022-10-20 | End: 2022-10-20

## 2022-10-20 NOTE — PROGRESS NOTES
Joel experienced rash and itching during an infusion of 110 mg Fabrazyme at a constant rate.  As a result, Dr. Rod and I decided to keep the dose the same and titrate the next infusion.  During this infusion, Joel had a similar experience.  After consulting with Joel, his parents, myself, and Dr. Rod, we decided to restart Joel at a lower dose (10 mg) for two infusions, increasing by 10 mg after every two infusions.    Joel Lundberg Fabrazyme Restart    Date: 10/19/2022  Weight as of 10/03/2022:  194 pounds (88 kg)    First two infusions:  10 mg IV over 4.2 hours in NaCL 0.9%.    Total volume= 50 mL.   Infusion rate at 12 mL/hr.   Total infusion time is approximately 4.2 hours.  Infuse intravenously every two weeks through a low protein binding in line filter.     After the first two infusions, the dose will increase by 10 mg for two infusions, until the target dose of 90 mg is reached.    20 mg in 50 mL volume IV over 4.2 hours.  Infusion rate will be 12 mL/hour.   30 mg IV in 50 mL over 4.2 hours.  Infusion rate at 12 mL per hour.  40 mg IV in 100 mL over 4.2 hours.  Infusion rate at 25 mL per hour.  50 mg IV in 100 mL over 4.2 hours.  Infusion rate at 25 mL per hour.  60 mg IV in 100 mL over 4 hours.  Infusion rate at 25 mL per hour.  70 mg IV in 250 mL over 5 hours.  Infusion rate at 50 mL per hour.  80 mg IV in 250 mL over 5 hours.  Infusion rate at 50 mL per hour.  90 mg IV in 250 mL over 5 hours.  Infusion rate will be 50 mL per hour.      Premedications:    Acetaminophen (TYLENOL) tablet 975 mg   Take by mouth 30-60 minutes prior to each Fabrazyme infusion       MethylPREDNISolone sodium succinate (solu-MEDROL) injection 80 mg   Administer 80 mg of methylprednisolone intravenously at each infusion     Dilute in 25-50 mL normal saline and infuse IV over 15-20 minutes.  Wait 30 minutes after completion of infusion before starting the Fabrazyme infusion.      Loratadine (CLARITIN)  tablet 10 mg Every visit    Take 10 mg by mouth about 30-60 minutes before each infusion.  Patient may take their own supply.

## 2022-10-24 LAB — SCANNED LAB RESULT: ABNORMAL

## 2022-10-25 ENCOUNTER — HOME INFUSION (PRE-WILLOW HOME INFUSION) (OUTPATIENT)
Dept: PHARMACY | Facility: CLINIC | Age: 22
End: 2022-10-25

## 2022-10-25 NOTE — PROGRESS NOTES
This is a recent snapshot of the patient's [facility] medical record.  For current drug dose and complete information and questions, call [phone] or In Basket pool, fv home infusion ([id number])  CSN Number:  [id number]

## 2022-10-27 NOTE — PROGRESS NOTES
This is a recent snapshot of the patient's [facility] medical record.  For current drug dose and complete information and questions, call [phone] or In Basket pool, fv home infusion ([id number])  CSN Number:  [id number]

## 2022-10-27 NOTE — PROGRESS NOTES
This is a recent snapshot of the patient's [facility] medical record.  For current drug dose and complete information and questions, call [phone] or In Basket pool, fv home infusion ([id number])  CSN Number:  [id number]

## 2022-10-28 NOTE — PROGRESS NOTES
This is a recent snapshot of the patient's [facility] medical record.  For current drug dose and complete information and questions, call [phone] or In Basket pool, fv home infusion ([id number])  CSN Number:  [id number]

## 2022-11-03 NOTE — PROGRESS NOTES
This is a recent snapshot of the patient's [facility] medical record.  For current drug dose and complete information and questions, call [phone] or In Basket pool, fv home infusion ([id number])  CSN Number:  [id number]

## 2022-11-05 NOTE — PROGRESS NOTES
This is a recent snapshot of the patient's [facility] medical record.  For current drug dose and complete information and questions, call [phone] or In Basket pool, fv home infusion ([id number])  CSN Number:  [id number]

## 2022-11-13 NOTE — PROGRESS NOTES
This is a recent snapshot of the patient's [facility] medical record.  For current drug dose and complete information and questions, call [phone] or In Basket pool, fv home infusion ([id number])  CSN Number:  [id number]

## 2022-11-15 NOTE — PROGRESS NOTES
This is a recent snapshot of the patient's [facility] medical record.  For current drug dose and complete information and questions, call [phone] or In Basket pool, fv home infusion ([id number])  CSN Number:  [id number]

## 2022-11-21 NOTE — PROGRESS NOTES
This is a recent snapshot of the patient's [facility] medical record.  For current drug dose and complete information and questions, call [phone] or In Basket pool, fv home infusion ([id number])  CSN Number:  [id number]

## 2022-11-30 NOTE — PROGRESS NOTES
This is a recent snapshot of the patient's [facility] medical record.  For current drug dose and complete information and questions, call [phone] or In Basket pool, fv home infusion ([id number])  CSN Number:  [id number]

## 2022-12-19 NOTE — PROGRESS NOTES
This is a recent snapshot of the patient's [facility] medical record.  For current drug dose and complete information and questions, call [phone] or In Basket pool, fv home infusion ([id number])  CSN Number:  [id number]

## 2023-01-03 ENCOUNTER — LAB REQUISITION (OUTPATIENT)
Dept: LAB | Facility: CLINIC | Age: 23
End: 2023-01-03
Payer: COMMERCIAL

## 2023-01-03 DIAGNOSIS — E75.21 FABRY (-ANDERSON) DISEASE (H): ICD-10-CM

## 2023-01-03 LAB
ALBUMIN MFR UR ELPH: <6 MG/DL (ref 1–14)
ALBUMIN SERPL BCG-MCNC: 4.2 G/DL (ref 3.5–5.2)
ALBUMIN UR-MCNC: NEGATIVE MG/DL
ALP SERPL-CCNC: 59 U/L (ref 40–129)
ALT SERPL W P-5'-P-CCNC: 13 U/L (ref 10–50)
ANION GAP SERPL CALCULATED.3IONS-SCNC: 16 MMOL/L (ref 7–15)
APPEARANCE UR: CLEAR
AST SERPL W P-5'-P-CCNC: 23 U/L (ref 10–50)
BASOPHILS # BLD AUTO: 0 10E3/UL (ref 0–0.2)
BASOPHILS NFR BLD AUTO: 1 %
BILIRUB SERPL-MCNC: 0.7 MG/DL
BILIRUB UR QL STRIP: NEGATIVE
BUN SERPL-MCNC: 13.8 MG/DL (ref 6–20)
CALCIUM SERPL-MCNC: 9 MG/DL (ref 8.6–10)
CHLORIDE SERPL-SCNC: 104 MMOL/L (ref 98–107)
COLOR UR AUTO: ABNORMAL
CREAT SERPL-MCNC: 0.67 MG/DL (ref 0.67–1.17)
CREAT UR-MCNC: 24 MG/DL
CREAT UR-MCNC: 24 MG/DL
DEPRECATED HCO3 PLAS-SCNC: 22 MMOL/L (ref 22–29)
EOSINOPHIL # BLD AUTO: 0.3 10E3/UL (ref 0–0.7)
EOSINOPHIL NFR BLD AUTO: 5 %
ERYTHROCYTE [DISTWIDTH] IN BLOOD BY AUTOMATED COUNT: 12.5 % (ref 10–15)
GFR SERPL CREATININE-BSD FRML MDRD: >90 ML/MIN/1.73M2
GLUCOSE SERPL-MCNC: 77 MG/DL (ref 70–99)
GLUCOSE UR STRIP-MCNC: NEGATIVE MG/DL
HCT VFR BLD AUTO: 43.5 % (ref 40–53)
HGB BLD-MCNC: 14.6 G/DL (ref 13.3–17.7)
HGB UR QL STRIP: NEGATIVE
IMM GRANULOCYTES # BLD: 0 10E3/UL
IMM GRANULOCYTES NFR BLD: 0 %
KETONES UR STRIP-MCNC: NEGATIVE MG/DL
LEUKOCYTE ESTERASE UR QL STRIP: NEGATIVE
LYMPHOCYTES # BLD AUTO: 1.7 10E3/UL (ref 0.8–5.3)
LYMPHOCYTES NFR BLD AUTO: 32 %
MCH RBC QN AUTO: 30.8 PG (ref 26.5–33)
MCHC RBC AUTO-ENTMCNC: 33.6 G/DL (ref 31.5–36.5)
MCV RBC AUTO: 92 FL (ref 78–100)
MICROALBUMIN UR-MCNC: <12 MG/L
MICROALBUMIN/CREAT UR: NORMAL MG/G{CREAT}
MONOCYTES # BLD AUTO: 0.4 10E3/UL (ref 0–1.3)
MONOCYTES NFR BLD AUTO: 8 %
NEUTROPHILS # BLD AUTO: 2.8 10E3/UL (ref 1.6–8.3)
NEUTROPHILS NFR BLD AUTO: 54 %
NITRATE UR QL: NEGATIVE
NRBC # BLD AUTO: 0 10E3/UL
NRBC BLD AUTO-RTO: 0 /100
PH UR STRIP: 7 [PH] (ref 5–7)
PLATELET # BLD AUTO: 268 10E3/UL (ref 150–450)
POTASSIUM SERPL-SCNC: 4.1 MMOL/L (ref 3.4–5.3)
PROT SERPL-MCNC: 6.5 G/DL (ref 6.4–8.3)
PROT/CREAT 24H UR: NORMAL MG/G{CREAT}
RBC # BLD AUTO: 4.74 10E6/UL (ref 4.4–5.9)
RBC URINE: 0 /HPF
SODIUM SERPL-SCNC: 142 MMOL/L (ref 136–145)
SP GR UR STRIP: 1 (ref 1–1.03)
SPERM #/AREA URNS HPF: PRESENT /HPF
UROBILINOGEN UR STRIP-MCNC: NORMAL MG/DL
WBC # BLD AUTO: 5.2 10E3/UL (ref 4–11)
WBC URINE: <1 /HPF

## 2023-01-03 PROCEDURE — 80053 COMPREHEN METABOLIC PANEL: CPT | Performed by: PEDIATRICS

## 2023-01-03 PROCEDURE — 82570 ASSAY OF URINE CREATININE: CPT | Performed by: PEDIATRICS

## 2023-01-03 PROCEDURE — 81001 URINALYSIS AUTO W/SCOPE: CPT | Performed by: PEDIATRICS

## 2023-01-03 PROCEDURE — 84156 ASSAY OF PROTEIN URINE: CPT | Performed by: PEDIATRICS

## 2023-01-03 PROCEDURE — 85025 COMPLETE CBC W/AUTO DIFF WBC: CPT | Performed by: PEDIATRICS

## 2023-01-17 LAB — SCANNED LAB RESULT: ABNORMAL

## 2023-01-23 LAB — SCANNED LAB RESULT: NORMAL

## 2023-03-13 ENCOUNTER — TELEPHONE (OUTPATIENT)
Dept: CONSULT | Facility: CLINIC | Age: 23
End: 2023-03-13
Payer: COMMERCIAL

## 2023-03-13 NOTE — TELEPHONE ENCOUNTER
On (3/13/2023) VF called on behalf of patients mother wanting to have the siblings appointments swapped out mom wanted joel to have his appointment today 3/13/2023 at 10:40 AM and have Asaia appointment scheduled for 3/20/2023. I informed the VF mom and Joel would need to call in to make changes to the appointment. Joel was also contacted and a voice mail was left.

## 2023-03-14 ENCOUNTER — LAB REQUISITION (OUTPATIENT)
Dept: LAB | Facility: CLINIC | Age: 23
End: 2023-03-14
Payer: COMMERCIAL

## 2023-03-14 DIAGNOSIS — E75.21 FABRY (-ANDERSON) DISEASE (H): ICD-10-CM

## 2023-03-14 LAB
ALBUMIN SERPL BCG-MCNC: 4.5 G/DL (ref 3.5–5.2)
ALP SERPL-CCNC: 62 U/L (ref 40–129)
ALT SERPL W P-5'-P-CCNC: 23 U/L (ref 10–50)
ANION GAP SERPL CALCULATED.3IONS-SCNC: 13 MMOL/L (ref 7–15)
AST SERPL W P-5'-P-CCNC: 23 U/L (ref 10–50)
BASOPHILS # BLD AUTO: 0 10E3/UL (ref 0–0.2)
BASOPHILS NFR BLD AUTO: 1 %
BILIRUB SERPL-MCNC: 0.6 MG/DL
BUN SERPL-MCNC: 12.7 MG/DL (ref 6–20)
CALCIUM SERPL-MCNC: 9.1 MG/DL (ref 8.6–10)
CHLORIDE SERPL-SCNC: 107 MMOL/L (ref 98–107)
CREAT SERPL-MCNC: 0.61 MG/DL (ref 0.67–1.17)
DEPRECATED HCO3 PLAS-SCNC: 22 MMOL/L (ref 22–29)
EOSINOPHIL # BLD AUTO: 0.2 10E3/UL (ref 0–0.7)
EOSINOPHIL NFR BLD AUTO: 3 %
ERYTHROCYTE [DISTWIDTH] IN BLOOD BY AUTOMATED COUNT: 12.8 % (ref 10–15)
GFR SERPL CREATININE-BSD FRML MDRD: >90 ML/MIN/1.73M2
GLUCOSE SERPL-MCNC: 89 MG/DL (ref 70–99)
HCT VFR BLD AUTO: 44.6 % (ref 40–53)
HGB BLD-MCNC: 14.8 G/DL (ref 13.3–17.7)
HOLD SPECIMEN: NORMAL
IMM GRANULOCYTES # BLD: 0 10E3/UL
IMM GRANULOCYTES NFR BLD: 0 %
LYMPHOCYTES # BLD AUTO: 2 10E3/UL (ref 0.8–5.3)
LYMPHOCYTES NFR BLD AUTO: 29 %
MCH RBC QN AUTO: 30.6 PG (ref 26.5–33)
MCHC RBC AUTO-ENTMCNC: 33.2 G/DL (ref 31.5–36.5)
MCV RBC AUTO: 92 FL (ref 78–100)
MONOCYTES # BLD AUTO: 0.4 10E3/UL (ref 0–1.3)
MONOCYTES NFR BLD AUTO: 6 %
NEUTROPHILS # BLD AUTO: 4.3 10E3/UL (ref 1.6–8.3)
NEUTROPHILS NFR BLD AUTO: 61 %
NRBC # BLD AUTO: 0 10E3/UL
NRBC BLD AUTO-RTO: 0 /100
PLATELET # BLD AUTO: 301 10E3/UL (ref 150–450)
POTASSIUM SERPL-SCNC: 4.1 MMOL/L (ref 3.4–5.3)
PROT SERPL-MCNC: 7 G/DL (ref 6.4–8.3)
RBC # BLD AUTO: 4.83 10E6/UL (ref 4.4–5.9)
SODIUM SERPL-SCNC: 142 MMOL/L (ref 136–145)
WBC # BLD AUTO: 7 10E3/UL (ref 4–11)

## 2023-03-14 PROCEDURE — 85025 COMPLETE CBC W/AUTO DIFF WBC: CPT | Performed by: PEDIATRICS

## 2023-03-14 PROCEDURE — 80053 COMPREHEN METABOLIC PANEL: CPT | Performed by: PEDIATRICS

## 2023-03-20 ENCOUNTER — VIRTUAL VISIT (OUTPATIENT)
Dept: CONSULT | Facility: CLINIC | Age: 23
End: 2023-03-20
Attending: PEDIATRICS
Payer: COMMERCIAL

## 2023-03-20 VITALS — WEIGHT: 200 LBS | HEIGHT: 62 IN | BODY MASS INDEX: 36.8 KG/M2

## 2023-03-20 DIAGNOSIS — E75.21 FABRY DISEASE (H): Primary | ICD-10-CM

## 2023-03-20 PROCEDURE — 99214 OFFICE O/P EST MOD 30 MIN: CPT | Mod: VID | Performed by: PEDIATRICS

## 2023-03-20 ASSESSMENT — PAIN SCALES - GENERAL: PAINLEVEL: NO PAIN (0)

## 2023-03-20 NOTE — LETTER
"3/20/2023      RE: Joel Lundberg  [address] [facility] [address]     Dear Colleague,    Thank you for the opportunity to participate in the care of your patient, Joel Lundberg, at the [facility] at [facility]. Please see a copy of my visit note below.                Advanced Therapies  [facility] [address]  [address] [address]   Phone: [phone]  Fax: [phone]  Date: 2023      Patient:  Joel Lundbegr   :   2000   MRN:     [id number]      Joel Lundberg  [address] [facility] [address]    Dear Dr. Yariel Carty and Joel Lundberg,    Thank you for sending Joel Lundberg to the [facility] Monday \"Advanced Therapies Clinic\" for consultation and treatment of:    1. Fabry disease (H)        PAST MEDICAL HISTORY:    From the oral history, and medical records that are available, these items are noted:    Patient Active Problem List   Diagnosis    Fabry disease (H)    Vasovagal syncope     Joel was seen in 2022. No major hospitalizations or ER visits. Due to some infusion associated reactions, mostly involving rash, his Fabrazyme dose was reduced to 10 mg every other week (QOW). It is being titrated up by 10 mg QOW if tolerating x 2 infusions at the new dose. He is currently at 40 mg QOW.    Joel does not have any major symptoms from Fabry disease. No complaints of peripheral neuropathy, abdominal pain, diarrhea, bloating, or headaches. He does not have any major questions or concerns during this time.     Medications:  Current Outpatient Medications   Medication Sig    agalsidase beta (FABRAZYME) 5 MG Inject 90 mg into the vein every 14 days Please see Epic Therapy Plan for infusion order details.    Cholecalciferol (VITAMIN D-3 PO) Take 25,000 Int'l Units by mouth once a week    loratadine (CLARITIN) 10 MG " "tablet Take 1 tablet (10 mg) by mouth daily    Pseudoeph-Doxylamine-DM-APAP (NYQUIL PO) Take by mouth At Bedtime     No current facility-administered medications for this visit.       Allergies:  Allergies   Allergen Reactions    Sulfa Drugs        Physical Examination:  Height 5' 2\" (157.5 cm), weight 200 lb (90.7 kg).  Weight %tile:Facility age limit for growth percentiles is 20 years.  Height %tile: Facility age limit for growth percentiles is 20 years.  Head Circumference %tile: Facility age limit for growth percentiles is 20 years.  BMI %tile: Facility age limit for growth percentiles is 20 years.    FAMILY HISTORY: A brief family medical history was reviewed.  REVIEW OF SYSTEMS: The review of systems negative for new eye, ear, heart, lung, liver, spleen, gastrointestinal, bone, muscle, integumentary, endocrinologic, brain or psychiatric issues except as noted above.  PHYSICAL EXAMINATION:   General: The patient is oriented to person, place and time at an age-appropriate manner.   HEENT: The facial features are normal and symmetric. The ears are of normal position and configuration and hearing is grossly normal.  Neck: The neck appears to have full range of motion  Chest: Does not appear to be tachypneic or in any respiratory distress.  Heart:  Joel  appears well perfused.  Abdomen: Not examined.  Extremities: The extremities are of normal configuration without contractures nor hyperlaxities.  Back: Not examined.   Integument: The visible part of the integument is of normal appearance without significant changes in pigmentation, birthmarks, or lesions.  Neuromuscular:  Mental Status Exam: Alert, awake. Fully oriented. No dysarthria, no dysphasia. Speech of normal fluency.  Appears to have normal strength.    LABORATORY RESULTS: Laboratory studies from the past year were reviewed.  Date Plasma GL3  Reference Range:  1.34 - 4.04 mcg/mL Lyso GL3  Reference Range:  <0.30 ng/mL Anti Fabrazyme Antibody Titer "   3/14/23 Pending Pending - -   1/3/23 - 15 Positive 1600   10/11/22 3.94  Positive 1600   09/01/2020 - - Positive 800   07/21/2020 - - Positive 3200   05/12/2020 3.92 - Positive 6400   11/19/2019 3.85 19.0 Positive 3200   08/12/2019 3.43 23.0 Positive 6400   09/10/2018 3.90 26.6 Positive 6400   03/13/2018 6.4 66.1 Positive 6400       ASSESSMENT:  Fabry disease  On ERT Fabrazyme at 40 mg QOW (~0.5 mg/kg)  Stable biomarkers  At risk for cardiomyopathy and myocardial infarction  At risk for stroke    PLAN/RECOMMENDATIONS:  Continue ERT. Continue to titrate up as per the plan: By 10 mg Qmonth if tolerating the new dose.   Recommend obtaining biomarkers plasma GL3 and LysoGL3 every 3 months and Antibody levels every 6 month.   Individuals with Fabry disease are at risk for stroke and MI due to their underlying disease. It is highly important to reduce the additional risk related to hypercholesterolemia. Recommend PCP to evaluate and treat Joel Patiñoih hypercholestrolemia with dietary, lifestyle interventions and medications as needed.  Regular cardiology evaluation by Viet Bermeo with echocardiogram/cMRI and ekg every 1-2 years  Recommend annual evaluation with Dr. Anthony Johnson (nephrology) for evaluation of renal function  Pharmacotherapy Consultation for Rare Metabolic Diseases, Dr. Nhoemy Mayen  F/u in 6 months  There will be an informational meeting with experts who are knowledgeable about your condition --- WORLD symposium --- In February 2024. Please go to [email or web address] for further details. You, and your family and friends are invited!   FOLLOW-UP INSTRUCTIONS FOR THE PATIENT:  If you are returning to clinic to review specific laboratory tests, please call the Genetic Counselor (see phone numbers below)  to confirm that we have received all of the results from reference laboratories prior to your appointment. If we have not received all of the test results, please discuss re-scheduling your  appointment.    With warmest regards,      Sarbjit Rod Ph.D., M.D.  Professor of Pediatrics  Medical Director, Advanced Therapies Program  Medical Director, PKU and Maternal PKU Clinic    Appointments: [phone]      Monday mornings: Advanced Therapies for Lysosomal Diseases Clinic   Tuesday amornings: PKU Clinic, Metabolism Clinic, and Genetics Clinic              [facility] laboratory: [phone]/ [phone]               [facility] laboratory: [phone]  Nurse Coordinator, Metabolism and Genetics:  Claudia Gil RN, [phone]    Pharmacotherapy Consultant:  Nohemy Mayen, PharmD, Pharmacotherapy for Metabolic Disorders (PIMD): [phone]    Genetic Counselor:  Michelle Real MS, [facility] (Genetic test Results): [phone]    Metabolic Dietician:  Heather Reis, Registered Dietician: [phone]    Advanced Therapies Clinic Scheduler:  Alise Majano, [phone]    Copies to:     Dr. Yariel Carty  [address] [address]    Joel RODRIGUEZ Al[place]ando [facility]  [address] [facility] [address]            Please do not hesitate to contact me if you have any questions/concerns.     Sincerely,       Dragan Rod MD

## 2023-03-20 NOTE — PROGRESS NOTES
"Video-Visit Details    Type of service:  Video Visit    Video Start Time (time video started): 8:02 AM    Video End Time (time video stopped): 8:37    Originating Location (pt. Location): Home        Distant Location (provider location):  Off-site    Mode of Communication:  Video Conference via [facility]                  Advanced Therapies  [facility] [address] [place] [address]   Phone: [phone]  Fax: [phone]  Date: 2023      Patient:  Joel Lundberg   :   2000   MRN:     [id number]      Joel Lundberg  [address] [facility] [address]    Dear Dr. Yariel Carty and Joel Lundberg,    Thank you for sending Joel Lundberg to the [facility] Monday \"Advanced Therapies Clinic\" for consultation and treatment of:    1. Fabry disease (H)        PAST MEDICAL HISTORY:    From the oral history, and medical records that are available, these items are noted:    Patient Active Problem List   Diagnosis     Fabry disease (H)     Vasovagal syncope     Joel was seen in 2022. No major hospitalizations or ER visits. Due to some infusion associated reactions, mostly involving rash, his Fabrazyme dose was reduced to 10 mg every other week (QOW). It is being titrated up by 10 mg QOW if tolerating x 2 infusions at the new dose. He is currently at 40 mg QOW.    Joel does not have any major symptoms from Fabry disease. No complaints of peripheral neuropathy, abdominal pain, diarrhea, bloating, or headaches. He does not have any major questions or concerns during this time.     Medications:  Current Outpatient Medications   Medication Sig     agalsidase beta (FABRAZYME) 5 MG Inject 90 mg into the vein every 14 days Please see Epic Therapy Plan for infusion order details.     Cholecalciferol (VITAMIN D-3 PO) Take 25,000 Int'l Units by mouth once a week     loratadine (CLARITIN) 10 MG tablet Take 1 tablet (10 mg) by mouth daily     " "Pseudoeph-Doxylamine-DM-APAP (NYQUIL PO) Take by mouth At Bedtime     No current facility-administered medications for this visit.       Allergies:  Allergies   Allergen Reactions     Sulfa Drugs        Physical Examination:  Height 5' 2\" (157.5 cm), weight 200 lb (90.7 kg).  Weight %tile:Facility age limit for growth percentiles is 20 years.  Height %tile: Facility age limit for growth percentiles is 20 years.  Head Circumference %tile: Facility age limit for growth percentiles is 20 years.  BMI %tile: Facility age limit for growth percentiles is 20 years.    FAMILY HISTORY: A brief family medical history was reviewed.  REVIEW OF SYSTEMS: The review of systems negative for new eye, ear, heart, lung, liver, spleen, gastrointestinal, bone, muscle, integumentary, endocrinologic, brain or psychiatric issues except as noted above.  PHYSICAL EXAMINATION:   General: The patient is oriented to person, place and time at an age-appropriate manner.   HEENT: The facial features are normal and symmetric. The ears are of normal position and configuration and hearing is grossly normal.  Neck: The neck appears to have full range of motion  Chest: Does not appear to be tachypneic or in any respiratory distress.  Heart:  Joel  appears well perfused.  Abdomen: Not examined.  Extremities: The extremities are of normal configuration without contractures nor hyperlaxities.  Back: Not examined.   Integument: The visible part of the integument is of normal appearance without significant changes in pigmentation, birthmarks, or lesions.  Neuromuscular:  Mental Status Exam: Alert, awake. Fully oriented. No dysarthria, no dysphasia. Speech of normal fluency.  Appears to have normal strength.    LABORATORY RESULTS: Laboratory studies from the past year were reviewed.  Date Plasma GL3  Reference Range:  1.34 - 4.04 mcg/mL Lyso GL3  Reference Range:  <0.30 ng/mL Anti Fabrazyme Antibody Titer   3/14/23 Pending Pending - -   1/3/23 - 15 " Positive 1600   10/11/22 3.94  Positive 1600   09/01/2020 - - Positive 800   07/21/2020 - - Positive 3200   05/12/2020 3.92 - Positive 6400   11/19/2019 3.85 19.0 Positive 3200   08/12/2019 3.43 23.0 Positive 6400   09/10/2018 3.90 26.6 Positive 6400   03/13/2018 6.4 66.1 Positive 6400       ASSESSMENT:  1. Fabry disease  2. On ERT Fabrazyme at 40 mg QOW (~0.5 mg/kg)  3. Stable biomarkers  4. At risk for cardiomyopathy and myocardial infarction  5. At risk for stroke    PLAN/RECOMMENDATIONS:  1. Continue ERT. Continue to titrate up as per the plan: By 10 mg Qmonth if tolerating the new dose.  2.  Recommend obtaining biomarkers plasma GL3 and LysoGL3 every 3 months and Antibody levels every 6 month.  3.  Individuals with Fabry disease are at risk for stroke and MI due to their underlying disease. It is highly important to reduce the additional risk related to hypercholesterolemia. Recommend PCP to evaluate and treat Joel Patiñoih hypercholestrolemia with dietary, lifestyle interventions and medications as needed.  4. Regular cardiology evaluation by Viet Bermeo with echocardiogram/cMRI and ekg every 1-2 years  5. Recommend annual evaluation with Dr. Anthony Johnson (nephrology) for evaluation of renal function  6. Pharmacotherapy Consultation for Rare Metabolic Diseases, Dr. Nohemy Mayen  7. F/u in 6 months  8. There will be an informational meeting with experts who are knowledgeable about your condition --- WORLD symposium --- In February 2024. Please go to [email or web address] for further details. You, and your family and friends are invited!   FOLLOW-UP INSTRUCTIONS FOR THE PATIENT:  If you are returning to clinic to review specific laboratory tests, please call the Genetic Counselor (see phone numbers below)  to confirm that we have received all of the results from reference laboratories prior to your appointment. If we have not received all of the test results, please discuss re-scheduling your  appointment.    With warmest regards,      Sarbjit Rod Ph.D., M.D.  Professor of Pediatrics  Medical Director, Advanced Therapies Program  Medical Director, PKU and Maternal PKU Clinic    Appointments: [phone]      Monday mornings: Advanced Therapies for Lysosomal Diseases Clinic   Tuesday amornings: PKU Clinic, Metabolism Clinic, and Genetics Clinic              [facility] laboratory: [phone]/ [phone]               [facility] laboratory: [phone]  Nurse Coordinator, Metabolism and Genetics:  Claudia Gil RN, [phone]    Pharmacotherapy Consultant:  Nohemy Mayen, PharmD, Pharmacotherapy for Metabolic Disorders (PIMD): [phone]    Genetic Counselor:  Michelle Real MS, [facility] (Genetic test Results): [phone]    Metabolic Dietician:  Heather Reis, Registered Dietician: [phone]    Advanced Therapies Clinic Scheduler:  Alise Majano, [phone]    Copies to:     Dr. Yariel Carty  [address] [address]    Joel Oliveira [facility]  [address] [facility] [address]

## 2023-03-20 NOTE — PATIENT INSTRUCTIONS
Advanced Therapies Clinic  [facility]  Pediatric Specialty Clinic ([facility])      Medications/Infusions   We did not make any changes to your medication titration schedule today.      Follow up visit    6 m       Helpful Numbers   To schedule appointments:              Alise Majano: [phone]  Pediatric Call Center for [facility]: [phone]  Radiology/ Imaging/ Echocardiogram: [phone]   Services:   [phone]     For questions about your/ your child's medical condition:            Dr. Dragan Rod M.D, Ph.D             Dr. Hortencia Mcnally M.D.                 You may call Claudia Gil RN at [phone] and one of the physicians will contact you back    For questions about medications/ supplies:          Dr. Nohemy Mayen Pharm D               Ph: [phone]    For questions about the research program you have enrolled in:           Dr. Staci ZUNIGA, CCRP               Ph: [phone]    For questions about genetic counseling or genetic testing:          Michelle Real M.S, [facility]             Ph: [phone]              If you have not already done so consider signing up for [employer] by speaking with the person at the  on your way out or go to [email or web address] to sign up online.   [employer] enables easy and confidential communication with your care team.

## 2023-03-20 NOTE — NURSING NOTE
Is the patient currently in the state of [place]? YES    Visit mode:VIDEO    If the visit is dropped, the patient can be reconnected by: VIDEO VISIT: Text to cell phone: [phone]    Will anyone else be joining the visit? NO      How would you like to obtain your AVS? Mail a copy    Are changes needed to the allergy or medication list? NO    Reason for visit:   Chief Complaint   Patient presents with     Video Visit     Malu Tamayo

## 2023-03-29 LAB — SCANNED LAB RESULT: NORMAL

## 2023-04-03 LAB — SCANNED LAB RESULT: NORMAL

## 2023-05-01 NOTE — PROGRESS NOTES
This is a recent snapshot of the patient's [facility] medical record.  For current drug dose and complete information and questions, call [phone] or In Basket pool, fv home infusion ([id number])  CSN Number:  [id number]

## 2023-06-20 NOTE — PROGRESS NOTES
This is a recent snapshot of the patient's [facility] medical record.  For current drug dose and complete information and questions, call [phone] or In Basket pool, fv home infusion ([id number])  CSN Number:  [id number]

## 2023-07-07 ENCOUNTER — TELEPHONE (OUTPATIENT)
Dept: CONSULT | Facility: CLINIC | Age: 23
End: 2023-07-07
Payer: COMMERCIAL

## 2023-07-07 NOTE — TELEPHONE ENCOUNTER
"Received a message from [facility] that Joel would like to stop his infusions.     Called and spoke with Joel - he recently moved and is now . He reports he \"has a lot going on\" and has a very difficult work schedule and isn't able to coordinate his infusions. Discussed with patient the importance on staying on ERT to avoid long term complications associated with Fabry disease. Patient acknowledges the complications but wants to hold off on his infusions at this time. Will check in with patient again in a month once he feels a little more settled and reassess plans for infusions.     Nohemy Mayen, PharmD, [facility]  Advanced Therapies Clinic  [phone]    "

## 2023-08-11 NOTE — TELEPHONE ENCOUNTER
Unable to speak with Joel, but did reach patient's mother, Sary. Per Sary, Joel would like to be discharged from [facility] and stop his infusions. Will send patient a message to contact clinic for a follow-up appointment to discuss plan.     Nohemy Mayen, PharmD, [facility]  Advanced Therapies [facility]  [phone]

## 2023-08-24 ENCOUNTER — TELEPHONE (OUTPATIENT)
Dept: CONSULT | Facility: CLINIC | Age: 23
End: 2023-08-24
Payer: COMMERCIAL

## 2023-08-24 NOTE — TELEPHONE ENCOUNTER
I left Joel a voice message in regards of coordinating a follow-up virtual visit in the Advance Therapy Clinic to meet with Dr Rod.      To schedule an appointment, please contact Dr. Rod's coordinator, Alise, at [phone]. She will be happy to help coordinate an appointment with you        Thank you,   Alise Majano  Specialty   [address] [address]  [email or web address]  [email or web address]  Office: [phone]

## 2023-10-16 ENCOUNTER — TELEPHONE (OUTPATIENT)
Dept: CONSULT | Facility: CLINIC | Age: 23
End: 2023-10-16
Payer: COMMERCIAL

## 2023-10-16 NOTE — TELEPHONE ENCOUNTER
Called patient due to request from \[facility]\"". Left VM for patient. Historically he has stated he does not want to be on service with \[facility]\"" and continue infusions. Will discuss discharge from \[facility]\"" services, and continue to reach out to patient for clinic follow up.     Ailyn Massey, PharmD, IgCP  Medication Access Pharmacist

## 2024-01-30 ENCOUNTER — TELEPHONE (OUTPATIENT)
Dept: CONSULT | Facility: CLINIC | Age: 24
End: 2024-01-30
Payer: COMMERCIAL

## 2024-05-10 NOTE — PROGRESS NOTES
"Jordon Dodge (22 y.o. Female)       Date of Birth   2001    Social Security Number       Address   [address] [place] [address]    Home Phone   [phone]    MRN   [id number]       Roman Catholic   Anabaptism    Marital Status   Single                            Admission Date   5/9/24    Admission Type   Emergency    Admitting Provider   Walter Cho MD    Attending Provider   Nelson Moeller MD    Department, Room/Bed   [address], S314/1       Discharge Date       Discharge Disposition       Discharge Destination                                 Attending Provider: Nelson Moeller MD    Allergies: Other    Isolation: None   Infection: None   Code Status: CPR    Ht: 172.7 cm (68\")   Wt: 94.5 kg (208 lb 6.4 oz)    Admission Cmt: None   Principal Problem: Pre-eclampsia in third trimester, without severe features [O14.93]                   Active Insurance as of 5/9/2024       Primary Coverage       Payor Plan Insurance Group Employer/Plan Group    [employer] BLUE [employer]OSS [employer]UE [employer] PPO [id number]       Payor Plan Address Payor Plan Phone Number Payor Plan Fax Number Effective Dates    [address] [phone]  4/1/2023 - None Entered    [place] [address]         Subscriber Name Subscriber Birth Date Member ID       JORDON DODGE 2001 [id number]                     Emergency Contacts        (Rel.) Home Phone Work Phone Mobile Phone    KIKE BLACK (Mother) -- -- [phone]              Insurance Information                  [employer] PPO Phone: [phone]    Subscriber: Jordon Dodge Subscriber#: [id number]    Group#: [id number] Precert#: --          Problem List             Codes Noted - Resolved       Hospital    * (Principal) Pre-eclampsia in third trimester, without severe features ICD-10-CM: O14.93  ICD-9-CM: 642.43 5/9/2024 - Present    Insulin controlled " 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  Patient's mother denies any fevers and/or infections.  PIV placed to left hand without complication.  Premedication of PO Acetaminophen given prior to the start of the infusion.  Second dose infusion completed without complication.  Vital signs remained stable throughout.  PIV removed at end of infusion without issues. Patient left with mother in stable condition at approximately 1315.          gestational diabetes mellitus (GDM) in third trimester ICD-10-CM: O24.414  ICD-9-CM: 648.83 2024 - Present        History & Physical        Walter Cho MD at 24 1731          [facility]   HISTORY AND PHYSICAL    Patient Name:Abdon Andres  : 2001  MRN: [id number]  Primary Care Physician: Provider, No Known  Date of admission: 2024    Subjective  Subjective     Chief Complaint:   Chief Complaint   Patient presents with    Contractions     35.0 cramping     History of Present Illness   Abdon Andres is a 22 y.o. female  @ 35w0d - who presented with concern for  labor that was ruled out. While evaluating patient, noted to have findings concerning for pre-eclampsia and we decided to keep due to these concerns for further evaluation and treatment. Prenatal care with Dr. Moeller complicated by gestational diabetes, insulin dependant with good control per patient.  She notes good fetal movement, no prior blood pressure issues. No bleeding or leaking. No longer cramping. No headache, vision changes or RUQ pain. Of note she had elevation in BP at 7, 21 and 32 weeks but has been otherwise normal.      Review of Systems   Constitutional:  Negative for chills and fever.   Eyes:  Negative for visual disturbance.   Gastrointestinal:  Negative for abdominal pain.   Genitourinary:  Negative for dysuria, pelvic pain, vaginal bleeding and vaginal discharge.   Neurological:  Negative for headaches.   All other systems reviewed and are negative.        Personal History     Past Medical History:   Diagnosis Date    Anxiety     Chlamydia     Gestational diabetes     insulin dependent       Past Surgical History:   Procedure Laterality Date    SPINE SURGERY         Family History: Her family history is not on file.     Social History: She  reports that she has never smoked. She has never used smokeless tobacco. She reports that she does not currently use alcohol. She reports that  she does not use drugs.    Home Medications:  Accu-Chek Guide, Accu-Chek Softclix Lancets, Insulin Glargine, Insulin Pen Needle, docusate sodium, ferrous sulfate, fluticasone, glucose blood, hydrocortisone, polyethylene glycol, and prenatal vitamin    Allergies:  She is allergic to other.    Objective   Objective     Vitals:    Temp:  [98.9 °F (37.2 °C)] 98.9 °F (37.2 °C)  Heart Rate:  [70-84] 74  Resp:  [15] 15  BP: (114-148)/(60-81) 148/77    Physical Exam  Vitals reviewed. Exam conducted with a chaperone present.   Constitutional:       General: She is not in acute distress.     Appearance: She is well-developed and normal weight.   HENT:      Head: Normocephalic and atraumatic.   Eyes:      General:         Right eye: No discharge.         Left eye: No discharge.      Conjunctiva/sclera: Conjunctivae normal.   Neck:      Thyroid: No thyromegaly.   Cardiovascular:      Rate and Rhythm: Normal rate and regular rhythm.      Heart sounds: Normal heart sounds. No murmur heard.  Pulmonary:      Effort: Pulmonary effort is normal. No respiratory distress.      Breath sounds: Normal breath sounds.   Abdominal:      General: There is distension (EFW 5.5#).      Palpations: Abdomen is soft.      Tenderness: There is no abdominal tenderness.   Musculoskeletal:         General: Normal range of motion.      Cervical back: Normal range of motion and neck supple.   Lymphadenopathy:      Cervical: No cervical adenopathy.   Skin:     General: Skin is warm and dry.      Findings: No rash.   Neurological:      Mental Status: She is alert and oriented to person, place, and time.   Psychiatric:         Behavior: Behavior normal.         Thought Content: Thought content normal.         Judgment: Judgment normal.          Result Review     Lab Results   Component Value Date    WBC 8.80 05/09/2024    HGB 9.1 (L) 05/09/2024    HCT 29.1 (L) 05/09/2024    MCV 68.6 (L) 05/09/2024     05/09/2024     Lab Results   Component Value Date     GLUCOSE 77 2024    BUN 6 2024    CREATININE 0.66 2024    EGFRRESULT 132 2024    EGFR 128.2 2024    BCR 9.1 2024    K 3.8 2024    CO2 21.0 (L) 2024    CALCIUM 9.1 2024    PROTENTOTREF 6.9 2024    ALBUMIN 3.5 2024    BILITOT 0.3 2024    AST 25 2024    ALT 44 (H) 2024     Urine P/C 1425.7 (Elevated)     Assessment & Plan  Assessment / Plan     Brief Patient Summary:  Abdon Andres is a 22 y.o. female  @ 35w0d   1) Pre-eclampsia - based on mild systolic elevation today (also had elevations at 32 and 21 weeks as well as initial BP in office was diastolic of 90).  Of concern is that with evaluation for severe features she has an elevation of her ALT alone (but is not the 2x normal to indicate a severe feature).  She has not other concern for severe features with hypertensive urgency, elevated creatinine > 1.1, neurologic symptoms (HA, vision changes, seizure, stroke), or thrombocytopenia (platelets < 100k).  So will admit for serial BP, 24 hour urine and repeat labs.   2) Gest Diabetes - reports stable on Long acting PM dose of 16 units. Rx continued inpatient.     Active Hospital Problems:  Active Hospital Problems    Diagnosis     **Pre-eclampsia in third trimester, without severe features     Insulin controlled gestational diabetes mellitus (GDM) in third trimester      Plan:   Admit for extended observation   Collect 24 hour urine   Repeat labs in AM, sooner if issue  Deliver with severe features  MFM consult with ultrasound in AM (long term management)   Hold steroids for now given diabetes and gestational age      DVT prophylaxis:  Mechanical DVT prophylaxis orders are present.        Walter Cho MD  2024  17:45 EDT      Electronically signed by Walter Cho MD at 24 [phone]       Facility-Administered Medications as of 5/10/2024   Medication Dose Route Frequency Provider Last Rate Last Admin     acetaminophen (TYLENOL) tablet 650 mg  650 mg Oral Q4H PRN Walter Cho MD   650 mg at 05/10/24 0854    bisacodyl (DULCOLAX) suppository 10 mg  10 mg Rectal Daily PRN Walter Cho MD        [COMPLETED] butalbital-acetaminophen-caffeine (FIORICET, ESGIC) -40 MG per tablet 2 tablet  2 tablet Oral Once Joan Plascencia MD   2 tablet at 05/06/24 1428    calcium carbonate (TUMS) chewable tablet 500 mg (200 mg elemental)  1 tablet Oral Daily PRN Walter Cho MD        docusate sodium (COLACE) capsule 100 mg  100 mg Oral BID Walter Cho MD   100 mg at 05/10/24 0935    ferrous sulfate tablet 325 mg  325 mg Oral Daily With Breakfast Walter Cho MD   325 mg at 05/10/24 0854    hydrOXYzine (ATARAX) tablet 50 mg  50 mg Oral Nightly PRN Walter Cho MD        insulin glargine (LANTUS, SEMGLEE) injection 16 Units  16 Units Subcutaneous QAM Delia Ortez MD   16 Units at 05/10/24 0854    insulin glargine (LANTUS, SEMGLEE) injection 20 Units  20 Units Subcutaneous Nightly Delia Ortez MD        [COMPLETED] lactated ringers bolus 1,000 mL  1,000 mL Intravenous Once Abbi Best MD 1,000 mL/hr at 05/09/24 1521 1,000 mL at 05/09/24 1521    lidocaine (XYLOCAINE) 1 % injection 0.5 mL  0.5 mL Intradermal Once PRN Walter Cho MD        ondansetron ODT (ZOFRAN-ODT) disintegrating tablet 8 mg  8 mg Oral Q8H PRN Walter Cho MD   8 mg at 05/10/24 1023    Or    ondansetron (ZOFRAN) injection 4 mg  4 mg Intravenous Q8H PRN Walter Cho MD        polyethylene glycol (MIRALAX) packet 17 g  17 g Oral Daily Walter Cho MD        prenatal vitamin tablet 1 tablet  1 tablet Oral Daily Walter Cho MD   1 tablet at 05/10/24 0854    sodium chloride 0.9 % flush 10 mL  10 mL Intravenous Q12H Walter Cho MD   10 mL at 05/09/24 2122    sodium chloride 0.9 % flush 10 mL  10 mL Intravenous PRN Walter Cho MD         sodium chloride 0.9 % infusion 40 mL  40 mL Intravenous PRN Walter Cho MD         Lab Results (last 72 hours)       Procedure Component Value Units Date/Time    Urine Culture - Urine, Urine, Clean Catch [[id number]] Collected: 05/09/24 1525    Specimen: Urine, Clean Catch Updated: 05/10/24 1027     Urine Culture 50,000 CFU/mL Mixed Janell Isolated    Narrative:      Specimen contains mixed organisms of questionable pathogenicity suggestive of contamination. If symptoms persist, suggest recollection.  Colonization of the urinary tract without infection is common. Treatment is discouraged unless the patient is symptomatic, pregnant, or undergoing an invasive urologic procedure.    POC Glucose Once [[id number]]  (Normal) Collected: 05/10/24 1019    Specimen: Blood Updated: 05/10/24 1020     Glucose 99 mg/dL     T Pallidum Antibody w/ reflex RPR (Syphilis) [[id number]]  (Normal) Collected: 05/10/24 0832    Specimen: Blood Updated: 05/10/24 1007     Treponemal AB Total Non-Reactive    Lactate Dehydrogenase [[id number]]  (Normal) Collected: 05/10/24 0832    Specimen: Blood Updated: 05/10/24 0943      U/L     Uric Acid [[id number]]  (Normal) Collected: 05/10/24 0832    Specimen: Blood Updated: 05/10/24 0943     Uric Acid 4.0 mg/dL     Comprehensive Metabolic Panel [[id number]]  (Abnormal) Collected: 05/10/24 0832    Specimen: Blood Updated: 05/10/24 0914     Glucose 76 mg/dL      BUN 5 mg/dL      Creatinine 0.46 mg/dL      Sodium 138 mmol/L      Potassium 4.0 mmol/L      Chloride 106 mmol/L      CO2 22.0 mmol/L      Calcium 9.0 mg/dL      Total Protein 6.7 g/dL      Albumin 3.2 g/dL      ALT (SGPT) 40 U/L      AST (SGOT) 25 U/L      Alkaline Phosphatase 183 U/L      Total Bilirubin 0.3 mg/dL      Globulin 3.5 gm/dL      A/G Ratio 0.9 g/dL      BUN/Creatinine Ratio 10.9     Anion Gap 10.0 mmol/L      eGFR 139.8 mL/min/1.73     Narrative:      GFR Normal >60  Chronic Kidney Disease <60  Kidney Failure <15      CBC  (No Diff) [[id number]]  (Abnormal) Collected: 05/10/24 0832    Specimen: Blood Updated: 05/10/24 0855     WBC 6.68 10*3/mm3      RBC 4.29 10*6/mm3      Hemoglobin 9.2 g/dL      Hematocrit 29.4 %      MCV 68.5 fL      MCH 21.4 pg      MCHC 31.3 g/dL      RDW 15.3 %      RDW-SD 37.1 fl      MPV 9.7 fL      Platelets 273 10*3/mm3     POC Glucose Once [[id number]]  (Normal) Collected: 05/10/24 0615    Specimen: Blood Updated: 05/10/24 0621     Glucose 84 mg/dL     POC Glucose Once [[id number]]  (Abnormal) Collected: 05/09/24 2220    Specimen: Blood Updated: 05/09/24 2221     Glucose 140 mg/dL     Protein / Creatinine Ratio, Urine - Urine, Clean Catch [[id number]]  (Abnormal) Collected: 05/09/24 1525    Specimen: Urine, Clean Catch Updated: 05/09/24 1648     Protein/Creatinine Ratio, Urine 1,425.7 mg/G Crea      Creatinine, Urine 68.6 mg/dL      Total Protein, Urine 97.8 mg/dL     Urinalysis, Microscopic Only - Urine, Clean Catch [[id number]]  (Abnormal) Collected: 05/09/24 1525    Specimen: Urine, Clean Catch Updated: 05/09/24 1617     RBC, UA 0-2 /HPF      WBC, UA 0-2 /HPF      Bacteria, UA 1+ /HPF      Squamous Epithelial Cells, UA 7-12 /HPF      Hyaline Casts, UA 3-6 /LPF      Methodology Manual Light Microscopy    Comprehensive Metabolic Panel [[id number]]  (Abnormal) Collected: 05/09/24 1525    Specimen: Blood Updated: 05/09/24 1612     Glucose 77 mg/dL      BUN 6 mg/dL      Creatinine 0.66 mg/dL      Sodium 139 mmol/L      Potassium 3.8 mmol/L      Chloride 107 mmol/L      CO2 21.0 mmol/L      Calcium 9.1 mg/dL      Total Protein 6.8 g/dL      Albumin 3.5 g/dL      ALT (SGPT) 44 U/L      AST (SGOT) 25 U/L      Alkaline Phosphatase 186 U/L      Total Bilirubin 0.3 mg/dL      Globulin 3.3 gm/dL      A/G Ratio 1.1 g/dL      BUN/Creatinine Ratio 9.1     Anion Gap 11.0 mmol/L      eGFR 128.2 mL/min/1.73     Narrative:      GFR Normal >60  Chronic Kidney Disease <60  Kidney Failure <15      Fetal Fibronectin - Vaginal Fluid,  Cervix [[id number]] Collected: 05/09/24 1529    Specimen: Vaginal Fluid from Cervix Updated: 05/09/24 1607     Fetal Fibronectin Negative     Bloody Specimen? No    Urinalysis With Culture If Indicated - Urine, Clean Catch [[id number]]  (Abnormal) Collected: 05/09/24 1525    Specimen: Urine, Clean Catch Updated: 05/09/24 1551     Color, UA Yellow     Appearance, UA Cloudy     pH, UA 7.5     Specific Gravity, UA 1.013     Glucose, UA Negative     Ketones, UA Trace     Bilirubin, UA Negative     Blood, UA Negative     Protein,  mg/dL (2+)     Leuk Esterase, UA Negative     Nitrite, UA Negative     Urobilinogen, UA 1.0 E.U./dL    Narrative:      In absence of clinical symptoms, the presence of pyuria, bacteria, and/or nitrites on the urinalysis result does not correlate with infection.    CBC & Differential [[id number]]  (Abnormal) Collected: 05/09/24 1525    Specimen: Blood Updated: 05/09/24 1544    Narrative:      The following orders were created for panel order CBC & Differential.  Procedure                               Abnormality         Status                     ---------                               -----------         ------                     CBC Auto Differential[[id number]]        Abnormal            Final result                 Please view results for these tests on the individual orders.    CBC Auto Differential [[id number]]  (Abnormal) Collected: 05/09/24 1525    Specimen: Blood Updated: 05/09/24 1544     WBC 8.80 10*3/mm3      RBC 4.24 10*6/mm3      Hemoglobin 9.1 g/dL      Hematocrit 29.1 %      MCV 68.6 fL      MCH 21.5 pg      MCHC 31.3 g/dL      RDW 15.3 %      RDW-SD 37.4 fl      MPV 10.2 fL      Platelets 283 10*3/mm3      Neutrophil % 80.7 %      Lymphocyte % 9.8 %      Monocyte % 7.6 %      Eosinophil % 0.9 %      Basophil % 0.3 %      Immature Grans % 0.7 %      Neutrophils, Absolute 7.10 10*3/mm3      Lymphocytes, Absolute 0.86 10*3/mm3      Monocytes, Absolute 0.67 10*3/mm3       Eosinophils, Absolute 0.08 10*3/mm3      Basophils, Absolute 0.03 10*3/mm3      Immature Grans, Absolute 0.06 10*3/mm3           Imaging Results (Last 72 Hours)       Procedure Component Value Units Date/Time    [facility] [[id number]] Collected: 05/10/24 0834     Updated: 05/10/24 1058    Narrative:      PAT NAME: JORDON DODGE  MED REC#: [id number]  BIRTH DA: 2001  PAT GEND: F  ACCOUNT#: [id number]  PAT TYPE: I  EXAM ILA: [id number]  REF PHYS BENITEZ BURNETT  ACCESSION [id number]      Comparison Studies  =================    There are no relevant prior studies to which this study is being compared      Indication  ========    Preeclampsia      Pregnancy  =========    Moses pregnancy. Number of fetuses: 1      Dating  ======    Method of dating: based on stated VANE  GA by prior assessment 35 w + 1 d  VANE by prior assessment: 6/13/2024  Ultrasound examination on: 5/10/2024  GA by U/S based upon: AC, BPD, Femur, HC  GA by U/S 36 w + 0 d  VANE by U/S: 6/7/2024  Assigned: based on stated VANE, selected on 05/10/2024  Assigned GA 35 w + 1 d  Assigned VANE: 6/13/2024  Pregnancy length 280 d      Fetal Biometry  ============    Standard  BPD 91.5 mm  37w 1d        94%        Hadlock    .1 mm  38w 0d        91%        Thierno    .5 mm  37w 0d        64%        Hadlock    .4 mm  35w 0d        53%        Hadlock    Femur 67.7 mm  34w 6d        34%        Hadlock    Humerus 59.5 mm  34w 4d        54%        Thierno    HC / AC 1.05    EFW 2,659 g          50%        Bryson    EFW (lb) 5 lb  EFW (oz) 14 oz  EFW by: Hadlock (BPD-HC-AC-FL)  Head / Face / Neck  Cephalic index 0.82          58%        Nicolaides    Extremities / Bony Struc  FL / BPD 0.74    FL / HC 0.21    FL / AC 0.22    Other Structures   bpm      General Evaluation  ================    Cardiac activity present.  bpm.  Fetal movements present.  Presentation cephalic.  Placenta fundal.  Umbilical  cord 3 vessel cord.  Amniotic fluid Amount of AF: normal. MVP 4.6 cm. AR 15.3 cm. Q1 3.3 cm, Q2 3.4 cm, Q3 4.0 cm, Q4 4.6 cm.      Fetal Anatomy  ============    Cranium: Appears normal  Lateral ventricles: Appears normal  Cerebellum: Appears normal  Lips: Appear normal  Profile: Appears normal  Nose: Appears normal  4-chamber view: suboptimal  RVOT view: suboptimal  LVOT view: Appears normal  Heart / Thorax  Aortic arch view: Appears normal  Bicaval view: Appears normal  Ductal arch view: not visualized  3-vessel view: not visualized  3-vessel-trachea view: not visualized  Stomach: Appears normal  Kidneys: Appears normal  Bladder: Appears normal  Cervical spine: Appears normal  Thoracic spine: Appears normal  Lumbar spine: Appears normal  Sacral spine: Appears normal  Arms: suboptimal  Legs: suboptimal      Impression  =========    Cephalic presentation.  Fundal placenta.  AR 15 cm, which is normal.  EFW 2659 g (50%, AC 53%)  BPP 8/8  Limited anatomy appears normal.      Recommendation  ===============    See epic consult note.        Sonographer: Brook Hare RDMS  Physician: Delia Ortez MD    Electronically signed by: Delia Ortez MD at: 2024/05/10 10:58          ECG/EMG Results (last 72 hours)       ** No results found for the last 72 hours. **          Orders (last 72 hrs)        Start     Ordered    05/10/24 2100  insulin glargine (LANTUS, SEMGLEE) injection 20 Units  Nightly         05/10/24 0825    05/10/24 1300  POC Glucose 4x Daily Fasting & PC  4 Times Daily Fasting & After Meals      Comments: Complete no more than 45 minutes prior to patient eating      05/10/24 0917    05/10/24 1135  Update Accommodation Code  Once         05/10/24 1134    05/10/24 1021  POC Glucose Once  PROCEDURE ONCE        Comments: Complete no more than 45 minutes prior to patient eating      05/10/24 1019    05/10/24 0915  insulin glargine (LANTUS, SEMGLEE) injection 16 Units  Every Morning         05/10/24 0825     05/10/24 0900  prenatal vitamin tablet 1 tablet  Daily         05/09/24 1742    05/10/24 0820  Uric Acid  Once         05/10/24 0820    05/10/24 0819  Lactate Dehydrogenase  Once         05/10/24 0820    05/10/24 0800  ferrous sulfate tablet 325 mg  Daily With Breakfast         05/09/24 1742    05/10/24 0702  Inpatient Maternal & Fetal Medicine Consult  IN AM        Specialty:  Maternal and Fetal Medicine  Provider:  (Not yet assigned)    05/09/24 1731    05/10/24 0622  POC Glucose Once  PROCEDURE ONCE        Comments: Complete no more than 45 minutes prior to patient eating      05/10/24 0615    05/10/24 0600  CBC (No Diff)  Morning Draw         05/09/24 1731    05/10/24 0600  T Pallidum Antibody w/ reflex RPR (Syphilis)  Morning Draw         05/09/24 1731    05/10/24 0600  Type & Screen  Morning Draw         05/09/24 1731    05/10/24 0600  Comprehensive Metabolic Panel  Morning Draw         05/09/24 1731 05/09/24 2222  POC Glucose Once  PROCEDURE ONCE        Comments: Complete no more than 45 minutes prior to patient eating      05/09/24 2220 05/09/24 2100  sodium chloride 0.9 % flush 10 mL  Every 12 Hours Scheduled         05/09/24 1731 05/09/24 2100  docusate sodium (COLACE) capsule 100 mg  2 Times Daily         05/09/24 1742 05/09/24 2100  insulin glargine (LANTUS, SEMGLEE) injection 16 Units  Nightly,   Status:  Discontinued         05/09/24 1742    05/09/24 2000  Vital Signs q 4 while awake  Every 4 Hours      Comments: While the patient is awake.    05/09/24 1731 05/09/24 1830  polyethylene glycol (MIRALAX) packet 17 g  Daily         05/09/24 1742    05/09/24 1800  NST Low risk >24 weeks:  Monitor for 30 minutes BID (Antepartum)  2 Times Daily      Comments: Fetal monitoring/NST:  Antepartum >24 weeks monitor fetus 30 minutes twice daily and PRN    05/09/24 1731 05/09/24 1744  Diet: Regular/House, Diabetic; Gestational Consistent Carbohydrate; Fluid Consistency: Thin (IDDSI 0)  Diet  Effective Now         05/09/24 1743    05/09/24 1732  Weigh Patient Daily  Daily       05/09/24 1731    05/09/24 1732  Inpatient Neonatology Consult  Once        Specialty:  Neonatology  Provider:  (Not yet assigned)    05/09/24 1731    05/09/24 1730  hydrOXYzine (ATARAX) tablet 50 mg  Nightly PRN         05/09/24 1731    05/09/24 1730  Protein, Urine, 24 Hour - Urine, Clean Catch  Once         05/09/24 1731    05/09/24 1730  [facility]  1 Time Imaging         05/09/24 1731    05/09/24 1728  Admit To Obstetrics Inpatient  Once         05/09/24 1731    05/09/24 1728  Code Status and Medical Interventions:  Continuous         05/09/24 1731    05/09/24 1728  Update Accommodation Code  Once         05/09/24 1731    05/09/24 1728  Place Sequential Compression Device  Once         05/09/24 1731    05/09/24 1728  Maintain Sequential Compression Device  Continuous         05/09/24 1731    05/09/24 1728  Intermittent Auscultation FOR LOW RISK PATIENTS - NST on Admission Along With Intermittent Auscultation of Fetal Heart Tones.  Per Order Details        Comments: Intermittent Auscultation FOR LOW RISK PATIENTS - NST on Admission Along With Intermittent Auscultation of Fetal Heart Tones and PRN    05/09/24 1731    05/09/24 1728  Antepartum Patients  <24 Weeks - Document Fetal Heart Tones Daily and PRN.  Per Order Details        Comments: For Antepartum Patients Less Than 24 Weeks - Document Fetal Heart Tones Daily & PRN.    05/09/24 1731    05/09/24 1728  Notify Provider (Specified)  Continuous        Comments: Open Order Report to View Parameters Requiring Provider Notification    05/09/24 1731    05/09/24 1728  Notify Provider of Tachysystole (Per Hospital Algorithm)  Continuous        Comments: Open Order Report to View Parameters Requiring Provider Notification    05/09/24 1731    05/09/24 1728  Notify Provider if Membranes Ruptured, Bleeding Greater Than 1 Pad Per Hour, Fetal Heart Tone  "Abnormality or Severe Pain  Continuous        Comments: Open Order Report to View Parameters Requiring Provider Notification    24 [id number] 1728  Initiate Group Beta Strep (GBS) Prophylaxis Protocol, If Criteria Met  Continuous        Comments: NO TREATMENT RECOMMENDED IF: 1) Maternal GBS Status Known Negative 2) Scheduled  Birth With Intact Membranes, Not in Labor 3) Maternal GBS Status Unknown, No Risk Factors  TREAT WITH ANTIBIOTICS IF:  1) Maternal GBS Status Known Positive 2) Maternal GBS Status Unknown With Risk Factors: a)  Previous Infant Affected By GBS Infection b) GBS Urinary Tract Infection (UTI) or Bacteriuria During Pregnancy c) Unexplained Maternal Fever (100.4F (38C) or Greater) During Labor d)  Prolonged Rupture of Membranes (18 or More Hours) e) Gestational Age Less Than 37 Weeks    24 17324 1728  Insert Peripheral IV  Once         24 17324 1728  Saline Lock & Maintain IV Access  Continuous         24 17324 1727  sodium chloride 0.9 % flush 10 mL  As Needed         24 1731    24 1727  sodium chloride 0.9 % infusion 40 mL  As Needed         24 17324 1727  lidocaine (XYLOCAINE) 1 % injection 0.5 mL  Once As Needed         24 1731    24 1727  acetaminophen (TYLENOL) tablet 650 mg  Every 4 Hours PRN         24 1731    24 1727  calcium carbonate (TUMS) chewable tablet 500 mg (200 mg elemental)  Daily PRN         24 17324 1727  ondansetron ODT (ZOFRAN-ODT) disintegrating tablet 8 mg  Every 8 Hours PRN        Placed in \"Or\" Linked Group    24 1731    24 1727  ondansetron (ZOFRAN) injection 4 mg  Every 8 Hours PRN        Placed in \"Or\" Linked Group    24 1731    24 1727  docusate sodium (COLACE) capsule 100 mg  2 Times Daily PRN,   Status:  Discontinued         24 1731    24 1727  bisacodyl (DULCOLAX) suppository 10 mg  Daily PRN  "        05/09/24 1731    05/09/24 1723  Initiate Observation Status  Once         05/09/24 1724    05/09/24 1656  Fetal Nonstress Test  Once        Comments: Patient presents with:  Contractions: 35.0 cramping      05/09/24 1655    05/09/24 1620  Protein / Creatinine Ratio, Urine - Urine, Clean Catch  STAT         05/09/24 1619    05/09/24 1600  lactated ringers bolus 1,000 mL  Once         05/09/24 1506    05/09/24 1543  Manual Differential  Once,   Status:  Canceled         05/09/24 1542    05/09/24 1542  Urinalysis, Microscopic Only - Urine, Clean Catch  Once        Comments: Collect and Hold for gestational age > 24 weeks      05/09/24 1541    05/09/24 1542  Urine Culture - Urine, Urine, Clean Catch  Once        Comments: Collect and Hold for gestational age > 24 weeks      05/09/24 1541    05/09/24 1526  Fetal Fibronectin - Vaginal Fluid, Cervix  Once         05/09/24 1526    05/09/24 1507  CBC & Differential  STAT         05/09/24 1506    05/09/24 1507  Comprehensive Metabolic Panel  STAT         05/09/24 1506    05/09/24 1507  CBC Auto Differential  PROCEDURE ONCE         05/09/24 1506    05/09/24 1442  Vital Signs  Per Hospital Policy        Comments: Repeat blood pressure every 30 minutes, until specified.    05/09/24 1442    05/09/24 1442  Fetal Nonstress Test  Once        Comments: For any patient >= 24 wks gestation.    05/09/24 1442    05/09/24 1442  Pulse Oximetry, Spot  Once         05/09/24 1442    05/09/24 1442  POC Glucose STAT  STAT        Comments: For any patient with any type of diabetes.      05/09/24 1442    05/09/24 1442  Urinalysis With Culture If Indicated - Urine, Clean Catch  Once        Comments: Collect and Hold for gestational age > 24 weeks      05/09/24 1442    05/09/24 1442  Obtain Fetal Heart Rate - For Gestational Age <24 weeks  Once         05/09/24 1442    05/09/24 1442  Continuous Uterine Monitoring - For Gestational Age >24 weeks  Once         05/09/24 1442    05/09/24 1442   NPO Diet NPO Type: Strict NPO  Diet Effective Now,   Status:  Canceled         05/09/24 1442    05/09/24 1442  Vaginal Exam  Once        Comments: Perform unless patient has vaginal bleeding without known placental site, known placental previa, <36 weeks with no abdominal , or complain of ROM and <36 weeks    05/09/24 1442    05/09/24 1442  POC PH Fluid (Nitrazine)  Once        Comments: If patient is presenting with possible ROM or leaking      05/09/24 1442    Unscheduled  Position Change - For Intra-Uterine Resusitation for Hypertonus, HyperStimulation or Non-Reassuring Fetal Status  As Needed       05/09/24 1731    Unscheduled  Position Change - For Intra-Uterine Resusitation for Hypertonus, HyperStimulation or Non-Reassuring Fetal Status  As Needed       05/09/24 1731                  Operative/Procedure Notes (last 72 hours)  Notes from 05/07/24 1254 through 05/10/24 1254   No notes of this type exist for this encounter.       Physician Progress Notes (last 4 days)  Notes from 05/06/24 1254 through 05/10/24 1254   No notes of this type exist for this encounter.       Consult Notes (last 72 hours)  Notes from 05/07/24 1254 through 05/10/24 1254   No notes of this type exist for this encounter.
